# Patient Record
Sex: FEMALE | NOT HISPANIC OR LATINO | URBAN - METROPOLITAN AREA
[De-identification: names, ages, dates, MRNs, and addresses within clinical notes are randomized per-mention and may not be internally consistent; named-entity substitution may affect disease eponyms.]

---

## 2023-01-30 ENCOUNTER — EVALUATION (OUTPATIENT)
Dept: PHYSICAL THERAPY | Facility: CLINIC | Age: 28
End: 2023-01-30

## 2023-01-30 DIAGNOSIS — F84.2 RETT SYNDROME: Primary | ICD-10-CM

## 2023-01-30 DIAGNOSIS — M62.89 QUADRICEP TIGHTNESS: ICD-10-CM

## 2023-01-30 DIAGNOSIS — R26.9 ABNORMALITY OF GAIT AND MOBILITY: ICD-10-CM

## 2023-01-30 NOTE — LETTER
2023    Cintia Weaver MD  [facility] [address] [facility] [address]    Patient: Anisa Doss   YOB: 1995   Date of Visit: 2023     Encounter Diagnosis     ICD-10-CM    1  Rett syndrome  F84 2       2  Quadricep tightness  M62 89       3  Abnormality of gait and mobility  R26 9           Dear Dr Srinivasan Chaney: Thank you for your recent referral of Anisa Doss  Please review the attached evaluation summary from Anaya's recent visit  Please verify that you agree with the plan of care by signing the attached order  If you have any questions or concerns, please do not hesitate to call  I sincerely appreciate the opportunity to share in the care of one of your patients and hope to have another opportunity to work with you in the near future  Sincerely,    Sue Ibarra, PT      Referring Provider:      I certify that I have read the below Plan of Care and certify the need for these services furnished under this plan of treatment while under my care  Cintia Weaver MD  [address] [place] [id number] [facility] [address]  Via Fax: [phone]          PT Evaluation     Today's date: 2023  Patient name: Anisa Doss  : 1995  MRN: [id number]  Referring provider: Amara Baird MD  Dx:   Encounter Diagnosis     ICD-10-CM    1  Rett syndrome  F84 2       2  Quadricep tightness  M62 89       3  Abnormality of gait and mobility  R26 9                      Assessment  Assessment details: Anisa Doss is a 24 y o  female who presents to PT with primary c/o B quad/hip flexor tightness resulting in difficulty walking/poor walking posture  Pt's mother present for this IE for hx taking/subjective intake  Pt presents today with gross limitations in B hip AROM in extension/abduction/ER/IR, B hip joint stiffness, functional BLE strength, forward trunk lean with standing/gait   Findings result in increased reliance on caregiver support, difficulty walking more than short household distances without assistance, fatigue, difficulty performing stairs without assistance  She would benefit from skilled PT to address these in order to increase B hip ROM to improve posture and gait quality while reducing caregiver dependence  Pt educated on related anatomy, posture/positioning, symptom presentation, findings, POC and verbalizes understanding  HEP provided this date and pt verbalizes understanding  They leave this IE with all current questions answered to their satisfaction  Impairments: abnormal gait, abnormal muscle tone, abnormal or restricted ROM, activity intolerance, lacks appropriate home exercise program, poor posture  and poor body mechanics  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  Understanding of Dx/Px/POC: good  Goals  STG-in 4 weeks  1  Pt/mother independent with HEP  2  Able to stand with 25% improved hip extension    LTG-by DC  1  Pt able to ambulate with 50% improved B hip extension/posture  2  Pt able to ascend/descend stairs with 25% less caregiver assistance  3   Increase FOTO by 5 pts    Plan  Plan details: 1-2x/week  Patient would benefit from: skilled physical therapy  Planned modality interventions: biofeedback, cryotherapy, electrical stimulation/Russian stimulation, iontophoresis, unattended electrical stimulation, ultrasound, traction, thermotherapy: paraffin bath, thermotherapy: hydrocollator packs, TENS and low level laser therapy  Planned therapy interventions: aquatic therapy, balance/weight bearing training, body mechanics training, coordination, flexibility, functional ROM exercises, gait training, graded exercise, home exercise program, therapeutic exercise, therapeutic activities, stretching, strengthening, postural training, patient education, neuromuscular re-education, manual therapy and joint mobilization  Frequency: 2x week  Duration in weeks: 8  Plan of Care beginning date: 1/30/2023  Plan of Care expiration date: 3/27/2023  Treatment plan discussed with: patient and caregiver        Subjective Evaluation    History of Present Illness  Mechanism of injury: Pt's mother present for history taking, subjective intake  Jed Gallardo is a 24 y o  female who presents to PT with primary c/o difficulty walking and tightness in B quads resulting in stooped posture  Pt dx with Rett syndrome  Was receiving PT which mother feels did help, but they were unable to go for awhile  Pt's mother is concerned with pt ambulating with forward trunk lean  Pt can ambulate very short distances at home without assistance  Requires help for stairs which she does everyday to get to her bedroom  Mother reports she can ascend stairs mostly on her own, but needs help going down  No apparent signs of pain or distress  Pain  Current pain ratin  At best pain ratin  At worst pain ratin    Social Support  Stairs in house: yes   Lives in: multiple-level home  Lives with: parents (siblings)    Treatments  Previous treatment: physical therapy  Current treatment: physical therapy  Patient Goals  Patient goals for therapy: improved balance, increased motion and independence with ADLs/IADLs  Patient goal: walk more upright        Objective     Active Range of Motion     Additional Active Range of Motion Details  Formal measurements NT this date  Gross limitations in B hip extension, abduction, ER/IR due to muscle tightness/joint stiffness    Strength/Myotome Testing     Additional Strength Details  Pt able to stand independently, walk short distances independently     Ambulation     Observational Gait   Decreased walking speed and stride length               Precautions: Rett syndromes, nonverbal, mother's name is Loreta      RE:  EPOC: 3/27             Manuals             B hip extension stretch MS-supine and SL                                                   Neuro Re-Ed Ther Ex             Prone hip extension stretch/LLPS             Standing at bar -posture                                                                              Pt edu/HEP MS            Ther Activity                                       Gait Training                                       Modalities

## 2023-01-30 NOTE — PROGRESS NOTES
PT Evaluation     Today's date: 2023  Patient name: Cassandra Xavier  : 1995  MRN: [id number]  Referring provider: Diane Ocampo MD  Dx:   Encounter Diagnosis     ICD-10-CM    1  Rett syndrome  F84 2       2  Quadricep tightness  M62 89       3  Abnormality of gait and mobility  R26 9                      Assessment  Assessment details: Cassandra Xavier is a 24 y o  female who presents to PT with primary c/o B quad/hip flexor tightness resulting in difficulty walking/poor walking posture  Pt's mother present for this IE for hx taking/subjective intake  Pt presents today with gross limitations in B hip AROM in extension/abduction/ER/IR, B hip joint stiffness, functional BLE strength, forward trunk lean with standing/gait  Findings result in increased reliance on caregiver support, difficulty walking more than short household distances without assistance, fatigue, difficulty performing stairs without assistance  She would benefit from skilled PT to address these in order to increase B hip ROM to improve posture and gait quality while reducing caregiver dependence  Pt educated on related anatomy, posture/positioning, symptom presentation, findings, POC and verbalizes understanding  HEP provided this date and pt verbalizes understanding  They leave this IE with all current questions answered to their satisfaction  Impairments: abnormal gait, abnormal muscle tone, abnormal or restricted ROM, activity intolerance, lacks appropriate home exercise program, poor posture  and poor body mechanics  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  Understanding of Dx/Px/POC: good  Goals  STG-in 4 weeks  1  Pt/mother independent with HEP  2  Able to stand with 25% improved hip extension    LTG-by DC  1  Pt able to ambulate with 50% improved B hip extension/posture  2  Pt able to ascend/descend stairs with 25% less caregiver assistance  3   Increase FOTO by 5 pts    Plan  Plan details: 1-2x/week  Patient would benefit from: skilled physical therapy  Planned modality interventions: biofeedback, cryotherapy, electrical stimulation/Russian stimulation, iontophoresis, unattended electrical stimulation, ultrasound, traction, thermotherapy: paraffin bath, thermotherapy: hydrocollator packs, TENS and low level laser therapy  Planned therapy interventions: aquatic therapy, balance/weight bearing training, body mechanics training, coordination, flexibility, functional ROM exercises, gait training, graded exercise, home exercise program, therapeutic exercise, therapeutic activities, stretching, strengthening, postural training, patient education, neuromuscular re-education, manual therapy and joint mobilization  Frequency: 2x week  Duration in weeks: 8  Plan of Care beginning date: 2023  Plan of Care expiration date: 3/27/2023  Treatment plan discussed with: patient and caregiver        Subjective Evaluation    History of Present Illness  Mechanism of injury: Pt's mother present for history taking, subjective intake  Gisela Davis is a 24 y o  female who presents to PT with primary c/o difficulty walking and tightness in B quads resulting in stooped posture  Pt dx with Rett syndrome  Was receiving PT which mother feels did help, but they were unable to go for awhile  Pt's mother is concerned with pt ambulating with forward trunk lean  Pt can ambulate very short distances at home without assistance  Requires help for stairs which she does everyday to get to her bedroom  Mother reports she can ascend stairs mostly on her own, but needs help going down  No apparent signs of pain or distress    Pain  Current pain ratin  At best pain ratin  At worst pain ratin    Social Support  Stairs in house: yes   Lives in: multiple-level home  Lives with: parents (siblings)    Treatments  Previous treatment: physical therapy  Current treatment: physical therapy  Patient Goals  Patient goals for therapy: improved balance, increased motion and independence with ADLs/IADLs  Patient goal: walk more upright        Objective     Active Range of Motion     Additional Active Range of Motion Details  Formal measurements NT this date  Gross limitations in B hip extension, abduction, ER/IR due to muscle tightness/joint stiffness    Strength/Myotome Testing     Additional Strength Details  Pt able to stand independently, walk short distances independently     Ambulation     Observational Gait   Decreased walking speed and stride length                Precautions: Rett syndromes, nonverbal, mother's name is Loreta      RE:2/27  EPOC: 3/27             Manuals 1/30            B hip extension stretch MS-supine and SL                                                   Neuro Re-Ed                                                                                                        Ther Ex             Prone hip extension stretch/LLPS             Standing at bar -posture                                                                              Pt edu/HEP MS            Ther Activity                                       Gait Training                                       Modalities

## 2023-02-08 ENCOUNTER — OFFICE VISIT (OUTPATIENT)
Dept: PHYSICAL THERAPY | Facility: CLINIC | Age: 28
End: 2023-02-08

## 2023-02-08 DIAGNOSIS — F84.2 RETT SYNDROME: ICD-10-CM

## 2023-02-08 DIAGNOSIS — R26.9 ABNORMALITY OF GAIT AND MOBILITY: ICD-10-CM

## 2023-02-08 DIAGNOSIS — M62.89 QUADRICEP TIGHTNESS: Primary | ICD-10-CM

## 2023-02-08 NOTE — PROGRESS NOTES
Daily Note     Today's date: 2023  Patient name: Chitra Ham  : 1995  MRN: [id number]  Referring provider: Ryley Aguilar MD  Dx:   Encounter Diagnosis     ICD-10-CM    1  Quadricep tightness  M62 89       2  Rett syndrome  F84 2       3  Abnormality of gait and mobility  R26 9                      Subjective: Pt's mother reports she has been working on [employer] at home, feels she is standing a bit more upright  Notes Oneta [place] was very tired after IE last week  Objective: See treatment diary below      Assessment: Tolerated treatment well  Patient demonstrated fatigue post treatment and would benefit from continued PT  L hip flexor markedly more tight than R, both improved with manual stretching  Improved bilateral hip flexor flexibility in sidelying stretching  Mother helped block pt's trunk from rolling back to supine during sidelying stretching  Plan: Continue per plan of care  Progress treatment as tolerated         Precautions: Rett syndromes, nonverbal, mother's name is Loreta      RE:  EPOC: 3/27             Manuals            B hip extension stretch MS-supine and SL MS-in supine and in SL                                                  Neuro Re-Ed                                                                                                        Ther Ex             Prone hip extension stretch/LLPS             Standing at bar -posture                                                                              Pt edu/HEP MS            Ther Activity                                       Gait Training                                       Modalities

## 2023-02-15 ENCOUNTER — OFFICE VISIT (OUTPATIENT)
Dept: PHYSICAL THERAPY | Facility: CLINIC | Age: 28
End: 2023-02-15

## 2023-02-15 DIAGNOSIS — R26.9 ABNORMALITY OF GAIT AND MOBILITY: ICD-10-CM

## 2023-02-15 DIAGNOSIS — M62.89 QUADRICEP TIGHTNESS: Primary | ICD-10-CM

## 2023-02-15 DIAGNOSIS — F84.2 RETT SYNDROME: ICD-10-CM

## 2023-02-15 NOTE — PROGRESS NOTES
Daily Note     Today's date: 2/15/2023  Patient name: China Davis  : 1995  MRN: [id number]  Referring provider: Sheryl Ac MD  Dx:   Encounter Diagnosis     ICD-10-CM    1  Quadricep tightness  M62 89       2  Rett syndrome  F84 2       3  Abnormality of gait and mobility  R26 9                      Subjective: Pt's mother notes Ernie Pham is able to stretch her legs a bit more easily now  Objective: See treatment diary below      Assessment: Tolerated treatment well  Patient demonstrated fatigue post treatment and would benefit from continued PT  LLE more resistant during manual stretching  Able to maintain more of sustained extension thru RLE this date  Improved extension bilaterally in sidelying positioning  Addition of prone positioning for sustained extension stretch in which pt is able to maintain comfortably and without resistance for about 5 mins  Mother and grandmother report improved ease of gait upon leaving session  Plan: Continue per plan of care  Progress treatment as tolerated         Precautions: Rett syndromes, nonverbal, mother's name is Loreta      RE:  EPOC: 3/27             Manuals 1/30 2/8 2/15          B hip extension stretch MS-supine and SL MS-in supine and in SL MS-supine and SL                                                 Neuro Re-Ed                                                                                                        Ther Ex             Prone hip extension stretch/LLPS   x5'          Standing at bar -posture                                                                              Pt edu/HEP MS            Ther Activity                                       Gait Training                                       Modalities

## 2023-02-22 ENCOUNTER — APPOINTMENT (OUTPATIENT)
Dept: PHYSICAL THERAPY | Facility: CLINIC | Age: 28
End: 2023-02-22

## 2023-03-01 ENCOUNTER — EVALUATION (OUTPATIENT)
Dept: PHYSICAL THERAPY | Facility: CLINIC | Age: 28
End: 2023-03-01

## 2023-03-01 DIAGNOSIS — R26.9 ABNORMALITY OF GAIT AND MOBILITY: ICD-10-CM

## 2023-03-01 DIAGNOSIS — M62.89 QUADRICEP TIGHTNESS: Primary | ICD-10-CM

## 2023-03-01 DIAGNOSIS — F84.2 RETT SYNDROME: ICD-10-CM

## 2023-03-01 NOTE — PROGRESS NOTES
PT Re-Evaluation     Today's date: 3/1/2023  Patient name: Mary Veliz  : 1995  MRN: [id number]  Referring provider: Louie Whalen MD  Dx:   Encounter Diagnosis     ICD-10-CM    1  Quadricep tightness  M62 89       2  Rett syndrome  F84 2       3  Abnormality of gait and mobility  R26 9                      Assessment  Assessment details: 3/1 Re-Eval: Pt's mother reports some improved tissue extensibility when stretching Anaya's legs, with some carryover to general mobility around the house  Pt is able to allow for improved stretching during visits  More objective progress is difficulty to obtain given pt's diagnosis  Improved FOTO score indicative of some functional improvements  She would benefit from continued PT to continued working on B hip flexor/quad mobility to improve functional mobility  Impairments: abnormal gait, abnormal muscle tone, abnormal or restricted ROM, activity intolerance, lacks appropriate home exercise program, poor posture  and poor body mechanics  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  Understanding of Dx/Px/POC: good  Goals  STG-in 4 weeks  1  Pt/mother independent with HEP-met  2  Able to stand with 25% improved hip extension-ongoing    LTG-by DC  1  Pt able to ambulate with 50% improved B hip extension/posture-ongoing  2  Pt able to ascend/descend stairs with 25% less caregiver assistance-ongoing  3   Increase FOTO by 5 pts-met    Plan  Plan details: 1-2x/week  Patient would benefit from: skilled physical therapy  Planned modality interventions: biofeedback, cryotherapy, electrical stimulation/Russian stimulation, iontophoresis, unattended electrical stimulation, ultrasound, traction, thermotherapy: paraffin bath, thermotherapy: hydrocollator packs, TENS and low level laser therapy  Planned therapy interventions: aquatic therapy, balance/weight bearing training, body mechanics training, coordination, flexibility, functional ROM exercises, gait training, graded exercise, home exercise program, therapeutic exercise, therapeutic activities, stretching, strengthening, postural training, patient education, neuromuscular re-education, manual therapy and joint mobilization  Frequency: 2x week  Duration in weeks: 8  Plan of Care beginning date: 2023  Plan of Care expiration date: 3/27/2023  Treatment plan discussed with: patient and caregiver        Subjective Evaluation    History of Present Illness  Mechanism of injury: 3/1 Re-Eval: Pt's mother reports overall improvements in tissue extensibility when she is stretching BODØ at home  Some carryover to improve general mobility/getting around the house  Pain  Current pain ratin  At best pain ratin  At worst pain ratin    Social Support  Stairs in house: yes   Lives in: multiple-level home  Lives with: parents (siblings)    Treatments  Previous treatment: physical therapy  Current treatment: physical therapy  Patient Goals  Patient goals for therapy: improved balance, increased motion and independence with ADLs/IADLs  Patient goal: walk more upright        Objective     Active Range of Motion     Additional Active Range of Motion Details  Formal measurements NT this date  Gross limitations in B hip extension, abduction, ER/IR due to muscle tightness/joint stiffness    Strength/Myotome Testing     Additional Strength Details  Pt able to stand independently, walk short distances independently     Ambulation     Observational Gait   Decreased walking speed and stride length                Precautions: Rett syndromes, nonverbal, mother's name is Loreta      RE:3/27  EPOC: 3/27       Manuals 1/30 2/8 2/15 3/1   B hip extension stretch MS-supine and SL MS-in supine and in SL MS-supine and SL MS-supine and SL                        Neuro Re-Ed                                                        Ther Ex       Prone hip extension stretch/LLPS   x5' x5'   Standing at bar -posture Pt edu/HEP MS      Ther Activity                     Gait Training                     Modalities

## 2023-03-08 ENCOUNTER — OFFICE VISIT (OUTPATIENT)
Dept: PHYSICAL THERAPY | Facility: CLINIC | Age: 28
End: 2023-03-08

## 2023-03-08 DIAGNOSIS — R26.9 ABNORMALITY OF GAIT AND MOBILITY: ICD-10-CM

## 2023-03-08 DIAGNOSIS — M62.89 QUADRICEP TIGHTNESS: Primary | ICD-10-CM

## 2023-03-08 DIAGNOSIS — F84.2 RETT SYNDROME: ICD-10-CM

## 2023-03-08 NOTE — PROGRESS NOTES
Daily Note     Today's date: 3/8/2023  Patient name: Carlitos Sanders  : 1995  MRN: [id number]  Referring provider: Jarad Jensen MD  Dx:   Encounter Diagnosis     ICD-10-CM    1  Quadricep tightness  M62 89       2  Rett syndrome  F84 2       3  Abnormality of gait and mobility  R26 9                      Subjective: No new reports      Objective: See treatment diary below      Assessment: Tolerated treatment well  Patient would benefit from continued PT  Decreased resistance to passive extension bilaterally, RLE continues to be more fluid than L  Sidelying stretching performed with knee bent in effort to get increased hip extension, which was successful  Plan: Continue per plan of care  Progress treatment as tolerated         Precautions: Rett syndromes, nonverbal, mother's name is Loreta      RE:3/27  EPOC: 3/27       Manuals 3/8  2/15 3/1   B hip extension stretch MS-supine and SL  MS-supine and SL MS-supine and SL                        Neuro Re-Ed                                                        Ther Ex       Prone hip extension stretch/LLPS x5'  x5' x5'   Standing at bar -posture                                          Pt edu/HEP       Ther Activity                     Gait Training                     Modalities

## 2023-03-15 ENCOUNTER — OFFICE VISIT (OUTPATIENT)
Dept: PHYSICAL THERAPY | Facility: CLINIC | Age: 28
End: 2023-03-15

## 2023-03-15 DIAGNOSIS — F84.2 RETT SYNDROME: ICD-10-CM

## 2023-03-15 DIAGNOSIS — M62.89 QUADRICEP TIGHTNESS: Primary | ICD-10-CM

## 2023-03-15 DIAGNOSIS — R26.9 ABNORMALITY OF GAIT AND MOBILITY: ICD-10-CM

## 2023-03-15 NOTE — PROGRESS NOTES
Daily Note     Today's date: 3/15/2023  Patient name: Chitra Ham  : 1995  MRN: [id number]  Referring provider: Ryley Aguilar MD  Dx:   Encounter Diagnosis     ICD-10-CM    1  Quadricep tightness  M62 89       2  Rett syndrome  F84 2       3  Abnormality of gait and mobility  R26 9                      Subjective: Pt's mother notes BODØ was able to stretch well into extension last night at home      Objective: See treatment diary below      Assessment: Tolerated treatment well  Patient demonstrated fatigue post treatment and would benefit from continued PT  Decreased extensibility this date/increased pt resistance  Plan: Continue per plan of care  Progress treatment as tolerated         Precautions: Rett syndromes, nonverbal, mother's name is Loreta      RE:3/27  EPOC: 3/27       Manuals 3/8 3/15 2/15 3/1   B hip extension stretch MS-supine and SL MS-supine and SL MS-supine and SL MS-supine and SL                        Neuro Re-Ed                                                        Ther Ex       Prone hip extension stretch/LLPS x5' x5' x5' x5'   Standing at bar -posture                                          Pt edu/HEP       Ther Activity                     Gait Training                     Modalities No

## 2023-03-22 ENCOUNTER — OFFICE VISIT (OUTPATIENT)
Dept: PHYSICAL THERAPY | Facility: CLINIC | Age: 28
End: 2023-03-22

## 2023-03-22 DIAGNOSIS — F84.2 RETT SYNDROME: ICD-10-CM

## 2023-03-22 DIAGNOSIS — M62.89 QUADRICEP TIGHTNESS: Primary | ICD-10-CM

## 2023-03-22 DIAGNOSIS — R26.9 ABNORMALITY OF GAIT AND MOBILITY: ICD-10-CM

## 2023-03-22 NOTE — PROGRESS NOTES
Daily Note     Today's date: 3/22/2023  Patient name: Martinez Odonnell  : 1995  MRN: [id number]  Referring provider: Karely Chaves MD  Dx:   Encounter Diagnosis     ICD-10-CM    1  Quadricep tightness  M62 89       2  Abnormality of gait and mobility  R26 9       3  Rett syndrome  F84 2                      Subjective: Pt's mother notes she stretched Nonasra Funk after she took a shower and was warm with good success  Objective: See treatment diary below      Assessment: Tolerated treatment fair  Patient demonstrated fatigue post treatment and would benefit from continued PT  Increased resistance through LLE this date resulting in increased difficulty getting to LE extension  Only able to maintain prone position for about 2 mins due to resistance  Plan: Continue per plan of care  Progress treatment as tolerated         Precautions: Rett syndromes, nonverbal, mother's name is Loreta      RE:3/27  EPOC: 3/27       Manuals 3/8 3/15 3/22    B hip extension stretch MS-supine and SL MS-supine and SL MS-in supine and SL                         Neuro Re-Ed                                                        Ther Ex       Prone hip extension stretch/LLPS x5' x5' x2'    Standing at bar -posture                                          Pt edu/HEP       Ther Activity                     Gait Training                     Modalities

## 2023-03-29 ENCOUNTER — APPOINTMENT (OUTPATIENT)
Dept: PHYSICAL THERAPY | Facility: CLINIC | Age: 28
End: 2023-03-29

## 2023-03-29 NOTE — PROGRESS NOTES
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  Understanding of Dx/Px/POC: good  Goals  STG-in 4 weeks  1  Pt/mother independent with HEP-met  2  Able to stand with 25% improved hip extension-ongoing    LTG-by DC  1  Pt able to ambulate with 50% improved B hip extension/posture-ongoing  2  Pt able to ascend/descend stairs with 25% less caregiver assistance-ongoing  3   Increase FOTO by 5 pts-met    Plan  Plan details: 1-2x/week  Patient would benefit from: skilled physical therapy  Planned modality interventions: biofeedback, cryotherapy, electrical stimulation/Russian stimulation, iontophoresis, unattended electrical stimulation, ultrasound, traction, thermotherapy: paraffin bath, thermotherapy: hydrocollator packs, TENS and low level laser therapy  Planned therapy interventions: aquatic therapy, balance/weight bearing training, body mechanics training, coordination, flexibility, functional ROM exercises, gait training, graded exercise, home exercise program, therapeutic exercise, therapeutic activities, stretching, strengthening, postural training, patient education, neuromuscular re-education, manual therapy and joint mobilization  Frequency: 2x week  Duration in weeks: 8  Plan of Care beginning date: 3/29/2023  Plan of Care expiration date: 2023  Treatment plan discussed with: patient and caregiver        Subjective Evaluation    History of Present Illness  Mechanism of injury: 3/29 Re-Eval:   Pain  Current pain ratin  At best pain ratin  At worst pain ratin    Social Support  Stairs in house: yes   Lives in: multiple-level home  Lives with: parents (siblings)    Treatments  Previous treatment: physical therapy  Current treatment: physical therapy  Patient Goals  Patient goals for therapy: improved balance, increased motion and independence with ADLs/IADLs  Patient goal: walk more upright        Objective     Active Range of Motion     Additional Active Range of Motion Details  Formal measurements NT this date  Gross limitations in B hip extension, abduction, ER/IR due to muscle tightness/joint stiffness    Strength/Myotome Testing     Additional Strength Details  Pt able to stand independently, walk short distances independently     Ambulation     Observational Gait   Decreased walking speed and stride length                Precautions: Rett syndromes, nonverbal, mother's name is Loreta      RE:4/26  EPOC: 5/24       Manuals 3/8 3/15 3/22    B hip extension stretch MS-supine and SL MS-supine and SL MS-in supine and SL                         Neuro Re-Ed                                                        Ther Ex       Prone hip extension stretch/LLPS x5' x5' x2'    Standing at bar -posture                                          Pt edu/HEP       Ther Activity                     Gait Training                     Modalities

## 2023-04-05 ENCOUNTER — EVALUATION (OUTPATIENT)
Dept: PHYSICAL THERAPY | Facility: CLINIC | Age: 28
End: 2023-04-05

## 2023-04-05 DIAGNOSIS — R26.9 ABNORMALITY OF GAIT AND MOBILITY: ICD-10-CM

## 2023-04-05 DIAGNOSIS — F84.2 RETT SYNDROME: ICD-10-CM

## 2023-04-05 DIAGNOSIS — M62.89 QUADRICEP TIGHTNESS: Primary | ICD-10-CM

## 2023-04-05 NOTE — PROGRESS NOTES
PT Re-Evaluation     Today's date: 2023  Patient name: Maria Fernanda Soni  : 1995  MRN: [id number]  Referring provider: James Rizvi MD  Dx:   Encounter Diagnosis     ICD-10-CM    1  Quadricep tightness  M62 89       2  Abnormality of gait and mobility  R26 9       3  Rett syndrome  F84 2                      Assessment  Assessment details:  Re-Eval: Pt continues to benefit from skilled PT to promote B quad/hip flexor extensibility  Great compliance with home stretching with mom  Pt able to stand more erect after PT session, diminishes with fatigue  No significant increase/decrease in FOTO score, expected given diagnosis  Pt has good tolerance to passive quad/hip flexor stretching, including with and without knee flexion and in various positions (supine/sidelying/prone)  Improved tissue extensibility helps improve gait, posture, stair climbing with mother at home  She would benefit from continued PT to address continued limitations in order to improve mobility, safety, comfort  Impairments: abnormal gait, abnormal muscle tone, abnormal or restricted ROM, activity intolerance, lacks appropriate home exercise program, poor posture  and poor body mechanics  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  Understanding of Dx/Px/POC: good  Goals  STG-in 4 weeks  1  Pt/mother independent with HEP-met  2  Able to stand with 25% improved hip extension-ongoing    LTG-by DC  1  Pt able to ambulate with 50% improved B hip extension/posture-ongoing  2  Pt able to ascend/descend stairs with 25% less caregiver assistance-ongoing  3   Increase FOTO by 5 pts-met    Plan  Plan details: 1-2x/week  Patient would benefit from: skilled physical therapy  Planned modality interventions: biofeedback, cryotherapy, electrical stimulation/Russian stimulation, iontophoresis, unattended electrical stimulation, ultrasound, traction, thermotherapy: paraffin bath, thermotherapy: hydrocollator packs, TENS and low level laser therapy  Planned therapy interventions: aquatic therapy, balance/weight bearing training, body mechanics training, coordination, flexibility, functional ROM exercises, gait training, graded exercise, home exercise program, therapeutic exercise, therapeutic activities, stretching, strengthening, postural training, patient education, neuromuscular re-education, manual therapy and joint mobilization  Frequency: 2x week  Duration in weeks: 4  Plan of Care beginning date: 2023  Plan of Care expiration date: 5/3/2023  Treatment plan discussed with: patient and caregiver        Subjective Evaluation    History of Present Illness  Mechanism of injury:  Re-Eval: Pt's mother reports Warren Schultz is doing well  Mom feels she has a better understanding of how to safely stretch her at home  Good compliance with home stretching reported  Missed last week's session due to illness  Pain  Current pain ratin  At best pain ratin  At worst pain ratin    Social Support  Stairs in house: yes   Lives in: multiple-level home  Lives with: parents (siblings)    Treatments  Previous treatment: physical therapy  Current treatment: physical therapy  Patient Goals  Patient goals for therapy: improved balance, increased motion and independence with ADLs/IADLs  Patient goal: walk more upright        Objective     Active Range of Motion     Additional Active Range of Motion Details  Formal measurements NT this date  Gross limitations in B hip extension, abduction, ER/IR due to muscle tightness/joint stiffness    Strength/Myotome Testing     Additional Strength Details  Pt able to stand independently, walk short distances independently     Ambulation     Observational Gait   Decreased walking speed and stride length                Precautions: Rett syndromes, nonverbal, mother's name is Loreta      RE:5/3  EPOC: 5/3       Manuals 3/8 3/15 3/22 4/5   B hip extension stretch MS-supine and SL MS-supine and SL MS-in supine and SL MS-in supine and SL                        Neuro Re-Ed                                                        Ther Ex       Prone hip extension stretch/LLPS x5' x5' x2' x5'   Standing at bar -posture                                          Pt edu/HEP       Ther Activity                     Gait Training                     Modalities

## 2023-04-05 NOTE — LETTER
2023    Gabino Reich MD  [address] [address] [facility] [address]    Patient: Oracio Mcgraw   YOB: 1995   Date of Visit: 2023     Encounter Diagnosis     ICD-10-CM    1  Quadricep tightness  M62 89       2  Abnormality of gait and mobility  R26 9       3  Rett syndrome  F84 2           Dear Dr Elías Garrett: Thank you for your recent referral of Oracio Mcgraw  Please review the attached evaluation summary from Anaya's recent visit  Please verify that you agree with the plan of care by signing the attached order  If you have any questions or concerns, please do not hesitate to call  I sincerely appreciate the opportunity to share in the care of one of your patients and hope to have another opportunity to work with you in the near future  Sincerely,    Svitlana Hawkins, PT      Referring Provider:      I certify that I have read the below Plan of Care and certify the need for these services furnished under this plan of treatment while under my care  Gabino Reich MD  [facility] [address] [facility] [address]  Via Fax: [phone]          PT Re-Evaluation     Today's date: 2023  Patient name: Oracio Mcgraw  : 1995  MRN: [id number]  Referring provider: Corrie Hale MD  Dx:   Encounter Diagnosis     ICD-10-CM    1  Quadricep tightness  M62 89       2  Abnormality of gait and mobility  R26 9       3  Rett syndrome  F84 2                      Assessment  Assessment details:  Re-Eval: Pt continues to benefit from skilled PT to promote B quad/hip flexor extensibility  Great compliance with home stretching with mom  Pt able to stand more erect after PT session, diminishes with fatigue  No significant increase/decrease in FOTO score, expected given diagnosis  Pt has good tolerance to passive quad/hip flexor stretching, including with and without knee flexion and in various positions (supine/sidelying/prone)   Improved tissue extensibility helps improve gait, posture, stair climbing with mother at home  She would benefit from continued PT to address continued limitations in order to improve mobility, safety, comfort  Impairments: abnormal gait, abnormal muscle tone, abnormal or restricted ROM, activity intolerance, lacks appropriate home exercise program, poor posture  and poor body mechanics  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  Understanding of Dx/Px/POC: good  Goals  STG-in 4 weeks  1  Pt/mother independent with HEP-met  2  Able to stand with 25% improved hip extension-ongoing    LTG-by DC  1  Pt able to ambulate with 50% improved B hip extension/posture-ongoing  2  Pt able to ascend/descend stairs with 25% less caregiver assistance-ongoing  3  Increase FOTO by 5 pts-met    Plan  Plan details: 1-2x/week  Patient would benefit from: skilled physical therapy  Planned modality interventions: biofeedback, cryotherapy, electrical stimulation/Russian stimulation, iontophoresis, unattended electrical stimulation, ultrasound, traction, thermotherapy: paraffin bath, thermotherapy: hydrocollator packs, TENS and low level laser therapy  Planned therapy interventions: aquatic therapy, balance/weight bearing training, body mechanics training, coordination, flexibility, functional ROM exercises, gait training, graded exercise, home exercise program, therapeutic exercise, therapeutic activities, stretching, strengthening, postural training, patient education, neuromuscular re-education, manual therapy and joint mobilization  Frequency: 2x week  Duration in weeks: 4  Plan of Care beginning date: 4/5/2023  Plan of Care expiration date: 5/3/2023  Treatment plan discussed with: patient and caregiver        Subjective Evaluation    History of Present Illness  Mechanism of injury: 4/5 Re-Eval: Pt's mother reports Jeremiah Belcher is doing well  Mom feels she has a better understanding of how to safely stretch her at home   Good compliance with home stretching reported  Missed last week's session due to illness  Pain  Current pain ratin  At best pain ratin  At worst pain ratin    Social Support  Stairs in house: yes   Lives in: multiple-level home  Lives with: parents (siblings)    Treatments  Previous treatment: physical therapy  Current treatment: physical therapy  Patient Goals  Patient goals for therapy: improved balance, increased motion and independence with ADLs/IADLs  Patient goal: walk more upright        Objective     Active Range of Motion     Additional Active Range of Motion Details  Formal measurements NT this date  Gross limitations in B hip extension, abduction, ER/IR due to muscle tightness/joint stiffness    Strength/Myotome Testing     Additional Strength Details  Pt able to stand independently, walk short distances independently     Ambulation     Observational Gait   Decreased walking speed and stride length               Precautions: Rett syndromes, nonverbal, mother's name is Loreta      RE:5/3  EPOC: 5/3       Manuals 3/8 3/15 3/22 4/5   B hip extension stretch MS-supine and SL MS-supine and SL MS-in supine and SL MS-in supine and SL                        Neuro Re-Ed                                                        Ther Ex       Prone hip extension stretch/LLPS x5' x5' x2' x5'   Standing at bar -posture                                          Pt edu/HEP       Ther Activity                     Gait Training                     Modalities

## 2023-04-26 ENCOUNTER — OFFICE VISIT (OUTPATIENT)
Dept: PHYSICAL THERAPY | Facility: CLINIC | Age: 28
End: 2023-04-26

## 2023-04-26 DIAGNOSIS — R26.9 ABNORMALITY OF GAIT AND MOBILITY: ICD-10-CM

## 2023-04-26 DIAGNOSIS — F84.2 RETT SYNDROME: ICD-10-CM

## 2023-04-26 DIAGNOSIS — M62.89 QUADRICEP TIGHTNESS: Primary | ICD-10-CM

## 2023-04-26 NOTE — PROGRESS NOTES
Daily Note     Today's date: 2023  Patient name: Zenobia Saenz  : 1995  MRN: [id number]  Referring provider: Kiran Garcia MD  Dx:   Encounter Diagnosis     ICD-10-CM    1  Quadricep tightness  M62 89       2  Abnormality of gait and mobility  R26 9       3  Rett syndrome  F84 2                      Subjective: No new reports or changes in status  Objective: See treatment diary below      Assessment: Decreased general resistance to supine and sidelying stretching this date bilaterally  Able to sustain stretch for longer durations today  Resistance with attempts at quad stretching in prone position  Noticeable improvement in erect posture in standing when going to leave clinic  Plan: Continue per plan of care  Progress treatment as tolerated         Precautions: Rett syndromes, nonverbal, mother's name is Loreta      RE:5/3  EPOC: 5/3       Manuals      B hip extension stretch MS-in supine (hip and knee ext) and SL (hip ext w/ knee flexion) MS-in supine (hip and knee ext) and SL (hip ext w/ knee flexed)                          Neuro Re-Ed                                                        Ther Ex       Prone hip extension stretch/LLPS x5' x5' with and without knee flex(prone quad str)     Standing at bar -posture                                          Pt edu/HEP       Ther Activity                     Gait Training                     Modalities

## 2023-05-10 ENCOUNTER — EVALUATION (OUTPATIENT)
Dept: PHYSICAL THERAPY | Facility: CLINIC | Age: 28
End: 2023-05-10

## 2023-05-10 DIAGNOSIS — R26.9 ABNORMALITY OF GAIT AND MOBILITY: ICD-10-CM

## 2023-05-10 DIAGNOSIS — M62.89 QUADRICEP TIGHTNESS: Primary | ICD-10-CM

## 2023-05-10 DIAGNOSIS — F84.2 RETT SYNDROME: ICD-10-CM

## 2023-05-10 NOTE — PROGRESS NOTES
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 2                      Assessment  Assessment details: 5/10 Re-Eval/DISCHARGE: pt's mother reports Maliha Lombardo is doing well overall  Good compliance with stretching and positioning at home  They are able to do more walking outside as the weather gets nicer  Improvement in FOTO score indicative of small functional improvements  Muscular tightness and resistance remains, but they are able to manage it well at home  Pt to be DC this date secondary to independence with HEP  Impairments: abnormal gait, abnormal muscle tone, abnormal or restricted ROM, activity intolerance, lacks appropriate home exercise program, poor posture  and poor body mechanics  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  Understanding of Dx/Px/POC: good  Goals  STG-in 4 weeks  1  Pt/mother independent with HEP-met  2  Able to stand with 25% improved hip extension-ongoing    LTG-by DC  1  Pt able to ambulate with 50% improved B hip extension/posture-ongoing  2  Pt able to ascend/descend stairs with 25% less caregiver assistance-ongoing  3   Increase FOTO by 5 pts-met    Plan  Plan details: DC 5/10/23  Patient would benefit from: skilled physical therapy  Planned modality interventions: biofeedback, cryotherapy, electrical stimulation/Russian stimulation, iontophoresis, unattended electrical stimulation, ultrasound, traction, thermotherapy: paraffin bath, thermotherapy: hydrocollator packs, TENS and low level laser therapy  Planned therapy interventions: aquatic therapy, balance/weight bearing training, body mechanics training, coordination, flexibility, functional ROM exercises, gait training, graded exercise, home exercise program, therapeutic exercise, therapeutic activities, stretching, strengthening, postural training, patient education, neuromuscular re-education, manual therapy and joint mobilization  Plan of Care beginning date: 5/10/2023  Plan of Care expiration date: 5/10/2023  Treatment plan discussed with: patient and caregiver        Subjective Evaluation    History of Present Illness  Mechanism of injury: 5/10 Re-eval: Pt's mother reports Alessia Arias is doing well  Good compliance with stretching and positioning at home  Able to walk more outside as the weather gets nicer  Pain  Current pain ratin  At best pain ratin  At worst pain ratin    Social Support  Stairs in house: yes   Lives in: multiple-level home  Lives with: parents (siblings)    Treatments  Previous treatment: physical therapy  Current treatment: physical therapy  Patient Goals  Patient goals for therapy: improved balance, increased motion and independence with ADLs/IADLs  Patient goal: walk more upright        Objective     Active Range of Motion     Additional Active Range of Motion Details  Formal measurements NT this date  Gross limitations in B hip extension, abduction, ER/IR due to muscle tightness/joint stiffness    Strength/Myotome Testing     Additional Strength Details  Pt able to stand independently, walk short distances independently     Ambulation     Observational Gait   Decreased walking speed and stride length                Precautions: Rett syndromes, nonverbal, mother's name is Loreta      RE:5/10  EPOC: 5/10       Manuals 4/12 4/26 5/10    B hip extension stretch MS-in supine (hip and knee ext) and SL (hip ext w/ knee flexion) MS-in supine (hip and knee ext) and SL (hip ext w/ knee flexed) MS-in supine (hip and knee ext) and SL (hip ext w/ knee flexion)                         Neuro Re-Ed                                                        Ther Ex       Prone hip extension stretch/LLPS x5' x5' with and without knee flex(prone quad str) Standing at bar -posture                                          Pt edu/HEP   MS-RE/DC, FOTO, subj/obj    Ther Activity                     Gait Training                     Modalities

## 2023-05-10 NOTE — LETTER
May 11, 2023    Isaias Angelo MD  [facility] [address] [facility] [address]    Patient: Jacoby Perrin   YOB: 1995   Date of Visit: 5/10/2023     Encounter Diagnosis     ICD-10-CM    1  Quadricep tightness  M62 89       2  Abnormality of gait and mobility  R26 9       3  Rett syndrome  F84 2           Dear Dr Everette Garcia: Thank you for your recent referral of Jacoby Perrin  Please review the attached evaluation summary from Anaya's recent visit  Please verify that you agree with the plan of care by signing the attached order  If you have any questions or concerns, please do not hesitate to call  I sincerely appreciate the opportunity to share in the care of one of your patients and hope to have another opportunity to work with you in the near future  Sincerely,    Jean Dean, PT      Referring Provider:      I certify that I have read the below Plan of Care and certify the need for these services furnished under this plan of treatment while under my care  Isaias Angelo MD  [address] [place] [address] [facility] [address]  Via Fax: [phone]          PT Re-Evaluation  and PT Discharge    Today's date: 5/10/2023  Patient name: Jacoby Perrin  : 1995  MRN: [id number]  Referring provider: Mack Haywood MD  Dx:   Encounter Diagnosis     ICD-10-CM    1  Quadricep tightness  M62 89       2  Abnormality of gait and mobility  R26 9       3  Rett syndrome  F84 2                      Assessment  Assessment details: 5/10 Re-Eval/DISCHARGE: pt's mother reports Jordan Lopez is doing well overall  Good compliance with stretching and positioning at home  They are able to do more walking outside as the weather gets nicer  Improvement in FOTO score indicative of small functional improvements  Muscular tightness and resistance remains, but they are able to manage it well at home   Pt to be DC this date secondary to independence with HEP     Impairments: abnormal gait, abnormal muscle tone, abnormal or restricted ROM, activity intolerance, lacks appropriate home exercise program, poor posture  and poor body mechanics  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  Understanding of Dx/Px/POC: good  Goals  STG-in 4 weeks  1  Pt/mother independent with HEP-met  2  Able to stand with 25% improved hip extension-ongoing    LTG-by DC  1  Pt able to ambulate with 50% improved B hip extension/posture-ongoing  2  Pt able to ascend/descend stairs with 25% less caregiver assistance-ongoing  3  Increase FOTO by 5 pts-met    Plan  Plan details: DC 5/10/23  Patient would benefit from: skilled physical therapy  Planned modality interventions: biofeedback, cryotherapy, electrical stimulation/Russian stimulation, iontophoresis, unattended electrical stimulation, ultrasound, traction, thermotherapy: paraffin bath, thermotherapy: hydrocollator packs, TENS and low level laser therapy  Planned therapy interventions: aquatic therapy, balance/weight bearing training, body mechanics training, coordination, flexibility, functional ROM exercises, gait training, graded exercise, home exercise program, therapeutic exercise, therapeutic activities, stretching, strengthening, postural training, patient education, neuromuscular re-education, manual therapy and joint mobilization  Plan of Care beginning date: 5/10/2023  Plan of Care expiration date: 5/10/2023  Treatment plan discussed with: patient and caregiver        Subjective Evaluation    History of Present Illness  Mechanism of injury: 5/10 Re-eval: Pt's mother reports Diana Rapp is doing well  Good compliance with stretching and positioning at home  Able to walk more outside as the weather gets nicer    Pain  Current pain ratin  At best pain ratin  At worst pain ratin    Social Support  Stairs in house: yes   Lives in: multiple-level home  Lives with: parents (siblings)    Treatments  Previous treatment: physical therapy  Current treatment: physical therapy  Patient Goals  Patient goals for therapy: improved balance, increased motion and independence with ADLs/IADLs  Patient goal: walk more upright        Objective     Active Range of Motion     Additional Active Range of Motion Details  Formal measurements NT this date  Gross limitations in B hip extension, abduction, ER/IR due to muscle tightness/joint stiffness    Strength/Myotome Testing     Additional Strength Details  Pt able to stand independently, walk short distances independently     Ambulation     Observational Gait   Decreased walking speed and stride length               Precautions: Rett syndromes, nonverbal, mother's name is Loreta      RE:5/10  EPOC: 5/10       Manuals 4/12 4/26 5/10    B hip extension stretch MS-in supine (hip and knee ext) and SL (hip ext w/ knee flexion) MS-in supine (hip and knee ext) and SL (hip ext w/ knee flexed) MS-in supine (hip and knee ext) and SL (hip ext w/ knee flexion)                         Neuro Re-Ed                                                        Ther Ex       Prone hip extension stretch/LLPS x5' x5' with and without knee flex(prone quad str)     Standing at bar -posture                                          Pt edu/HEP   MS-RE/DC, FOTO, subj/obj    Ther Activity                     Gait Training                     Modalities

## 2023-05-19 ENCOUNTER — HOSPITAL ENCOUNTER (INPATIENT)
Facility: HOSPITAL | Age: 28
LOS: 2 days | Discharge: HOME | End: 2023-05-21
Attending: EMERGENCY MEDICINE | Admitting: INTERNAL MEDICINE

## 2023-05-19 ENCOUNTER — APPOINTMENT (INPATIENT)
Dept: CT IMAGING | Facility: HOSPITAL | Age: 28
End: 2023-05-19

## 2023-05-19 ENCOUNTER — APPOINTMENT (EMERGENCY)
Dept: RADIOLOGY | Facility: HOSPITAL | Age: 28
End: 2023-05-19

## 2023-05-19 DIAGNOSIS — J18.9 PNEUMONIA INVOLVING LEFT LUNG: Primary | ICD-10-CM

## 2023-05-19 DIAGNOSIS — A41.9 SEPSIS (HCC): ICD-10-CM

## 2023-05-19 DIAGNOSIS — R09.02 HYPOXIA: ICD-10-CM

## 2023-05-19 PROBLEM — F79 INTELLECTUAL DISABILITY: Status: ACTIVE | Noted: 2023-05-19

## 2023-05-19 LAB
ALBUMIN SERPL BCP-MCNC: 3.7 G/DL (ref 3.5–5)
ALP SERPL-CCNC: 74 U/L (ref 34–104)
ALT SERPL W P-5'-P-CCNC: 16 U/L (ref 7–52)
ANION GAP SERPL CALCULATED.3IONS-SCNC: 8 MMOL/L (ref 4–13)
AST SERPL W P-5'-P-CCNC: 19 U/L (ref 13–39)
ATRIAL RATE: 115 BPM
BASOPHILS # BLD MANUAL: 0 THOUSAND/UL (ref 0–0.1)
BASOPHILS NFR MAR MANUAL: 0 % (ref 0–1)
BILIRUB SERPL-MCNC: 0.85 MG/DL (ref 0.2–1)
BUN SERPL-MCNC: 20 MG/DL (ref 5–25)
CALCIUM SERPL-MCNC: 9.6 MG/DL (ref 8.4–10.2)
CHLORIDE SERPL-SCNC: 104 MMOL/L (ref 96–108)
CO2 SERPL-SCNC: 25 MMOL/L (ref 21–32)
CREAT SERPL-MCNC: 0.68 MG/DL (ref 0.6–1.3)
EOSINOPHIL # BLD MANUAL: 0 THOUSAND/UL (ref 0–0.4)
EOSINOPHIL NFR BLD MANUAL: 0 % (ref 0–6)
ERYTHROCYTE [DISTWIDTH] IN BLOOD BY AUTOMATED COUNT: 13.2 % (ref 11.6–15.1)
GFR SERPL CREATININE-BSD FRML MDRD: 121 ML/MIN/1.73SQ M
GLUCOSE SERPL-MCNC: 132 MG/DL (ref 65–140)
HCT VFR BLD AUTO: 44.3 % (ref 34.8–46.1)
HGB BLD-MCNC: 14.5 G/DL (ref 11.5–15.4)
LACTATE SERPL-SCNC: 2.3 MMOL/L (ref 0.5–2)
LACTATE SERPL-SCNC: 2.9 MMOL/L (ref 0.5–2)
LACTATE SERPL-SCNC: 3.9 MMOL/L (ref 0.5–2)
LACTATE SERPL-SCNC: 4.2 MMOL/L (ref 0.5–2)
LACTATE SERPL-SCNC: 4.3 MMOL/L (ref 0.5–2)
LACTATE SERPL-SCNC: 4.8 MMOL/L (ref 0.5–2)
LYMPHOCYTES # BLD AUTO: 1.35 THOUSAND/UL (ref 0.6–4.47)
LYMPHOCYTES # BLD AUTO: 14 % (ref 14–44)
MCH RBC QN AUTO: 30.1 PG (ref 26.8–34.3)
MCHC RBC AUTO-ENTMCNC: 32.7 G/DL (ref 31.4–37.4)
MCV RBC AUTO: 92 FL (ref 82–98)
METAMYELOCYTES NFR BLD MANUAL: 1 % (ref 0–1)
MONOCYTES # BLD AUTO: 0.29 THOUSAND/UL (ref 0–1.22)
MONOCYTES NFR BLD: 3 % (ref 4–12)
NEUTROPHILS # BLD MANUAL: 7.82 THOUSAND/UL (ref 1.85–7.62)
NEUTS BAND NFR BLD MANUAL: 6 % (ref 0–8)
NEUTS SEG NFR BLD AUTO: 75 % (ref 43–75)
P AXIS: 50 DEGREES
PLATELET # BLD AUTO: 296 THOUSANDS/UL (ref 149–390)
PLATELET BLD QL SMEAR: ADEQUATE
PMV BLD AUTO: 9.5 FL (ref 8.9–12.7)
POTASSIUM SERPL-SCNC: 3.6 MMOL/L (ref 3.5–5.3)
PR INTERVAL: 122 MS
PROCALCITONIN SERPL-MCNC: 17.92 NG/ML
PROT SERPL-MCNC: 7.2 G/DL (ref 6.4–8.4)
QRS AXIS: 58 DEGREES
QRSD INTERVAL: 74 MS
QT INTERVAL: 306 MS
QTC INTERVAL: 423 MS
RBC # BLD AUTO: 4.82 MILLION/UL (ref 3.81–5.12)
RBC MORPH BLD: NORMAL
SARS-COV-2 RNA RESP QL NAA+PROBE: NEGATIVE
SODIUM SERPL-SCNC: 137 MMOL/L (ref 135–147)
T WAVE AXIS: 23 DEGREES
TOXIC GRANULES BLD QL SMEAR: PRESENT
VARIANT LYMPHS # BLD AUTO: 1 %
VENTRICULAR RATE: 115 BPM
WBC # BLD AUTO: 9.65 THOUSAND/UL (ref 4.31–10.16)
WBC TOXIC VACUOLES BLD QL SMEAR: PRESENT

## 2023-05-19 RX ORDER — SODIUM CHLORIDE 9 MG/ML
100 INJECTION, SOLUTION INTRAVENOUS CONTINUOUS
Status: DISCONTINUED | OUTPATIENT
Start: 2023-05-19 | End: 2023-05-21 | Stop reason: HOSPADM

## 2023-05-19 RX ORDER — METHYLPREDNISOLONE SODIUM SUCCINATE 125 MG/2ML
80 INJECTION, POWDER, LYOPHILIZED, FOR SOLUTION INTRAMUSCULAR; INTRAVENOUS ONCE
Status: COMPLETED | OUTPATIENT
Start: 2023-05-19 | End: 2023-05-19

## 2023-05-19 RX ORDER — GUAIFENESIN 600 MG/1
600 TABLET, EXTENDED RELEASE ORAL 2 TIMES DAILY
Status: DISCONTINUED | OUTPATIENT
Start: 2023-05-19 | End: 2023-05-20

## 2023-05-19 RX ORDER — IPRATROPIUM BROMIDE AND ALBUTEROL SULFATE 2.5; .5 MG/3ML; MG/3ML
3 SOLUTION RESPIRATORY (INHALATION) ONCE
Status: COMPLETED | OUTPATIENT
Start: 2023-05-19 | End: 2023-05-19

## 2023-05-19 RX ORDER — ACETAMINOPHEN 325 MG/1
650 TABLET ORAL EVERY 6 HOURS PRN
Status: DISCONTINUED | OUTPATIENT
Start: 2023-05-19 | End: 2023-05-21 | Stop reason: HOSPADM

## 2023-05-19 RX ORDER — LEVALBUTEROL INHALATION SOLUTION 1.25 MG/3ML
1.25 SOLUTION RESPIRATORY (INHALATION) EVERY 6 HOURS PRN
Status: DISCONTINUED | OUTPATIENT
Start: 2023-05-19 | End: 2023-05-19

## 2023-05-19 RX ORDER — ACETAMINOPHEN 325 MG/1
975 TABLET ORAL ONCE
Status: DISCONTINUED | OUTPATIENT
Start: 2023-05-19 | End: 2023-05-19

## 2023-05-19 RX ORDER — BENZONATATE 100 MG/1
100 CAPSULE ORAL 3 TIMES DAILY PRN
Status: DISCONTINUED | OUTPATIENT
Start: 2023-05-19 | End: 2023-05-21 | Stop reason: HOSPADM

## 2023-05-19 RX ORDER — LEVALBUTEROL INHALATION SOLUTION 1.25 MG/3ML
1.25 SOLUTION RESPIRATORY (INHALATION)
Status: DISCONTINUED | OUTPATIENT
Start: 2023-05-19 | End: 2023-05-21 | Stop reason: HOSPADM

## 2023-05-19 RX ORDER — ACETAMINOPHEN 160 MG/5ML
650 SUSPENSION ORAL EVERY 6 HOURS PRN
Status: DISCONTINUED | OUTPATIENT
Start: 2023-05-19 | End: 2023-05-19

## 2023-05-19 RX ORDER — ACETAMINOPHEN 325 MG/1
650 TABLET ORAL ONCE
Status: COMPLETED | OUTPATIENT
Start: 2023-05-19 | End: 2023-05-19

## 2023-05-19 RX ORDER — IPRATROPIUM BROMIDE AND ALBUTEROL SULFATE 2.5; .5 MG/3ML; MG/3ML
3 SOLUTION RESPIRATORY (INHALATION)
Status: DISCONTINUED | OUTPATIENT
Start: 2023-05-19 | End: 2023-05-19

## 2023-05-19 RX ORDER — IPRATROPIUM BROMIDE AND ALBUTEROL SULFATE 2.5; .5 MG/3ML; MG/3ML
3 SOLUTION RESPIRATORY (INHALATION) EVERY 4 HOURS PRN
Status: DISCONTINUED | OUTPATIENT
Start: 2023-05-19 | End: 2023-05-21 | Stop reason: HOSPADM

## 2023-05-19 RX ADMIN — ACETAMINOPHEN 650 MG: 325 TABLET, FILM COATED ORAL at 09:37

## 2023-05-19 RX ADMIN — SODIUM CHLORIDE 100 ML/HR: 0.9 INJECTION, SOLUTION INTRAVENOUS at 04:47

## 2023-05-19 RX ADMIN — IPRATROPIUM BROMIDE 0.5 MG: 0.5 SOLUTION RESPIRATORY (INHALATION) at 04:26

## 2023-05-19 RX ADMIN — SODIUM CHLORIDE 500 ML: 0.9 INJECTION, SOLUTION INTRAVENOUS at 04:15

## 2023-05-19 RX ADMIN — LEVALBUTEROL HYDROCHLORIDE 1.25 MG: 1.25 SOLUTION RESPIRATORY (INHALATION) at 10:23

## 2023-05-19 RX ADMIN — LEVALBUTEROL HYDROCHLORIDE 1.25 MG: 1.25 SOLUTION RESPIRATORY (INHALATION) at 19:36

## 2023-05-19 RX ADMIN — GUAIFENESIN 600 MG: 600 TABLET ORAL at 17:26

## 2023-05-19 RX ADMIN — SODIUM CHLORIDE 1000 ML: 0.9 INJECTION, SOLUTION INTRAVENOUS at 11:50

## 2023-05-19 RX ADMIN — ACETAMINOPHEN 650 MG: 325 TABLET ORAL at 04:01

## 2023-05-19 RX ADMIN — IPRATROPIUM BROMIDE AND ALBUTEROL SULFATE 3 ML: .5; 3 SOLUTION RESPIRATORY (INHALATION) at 02:23

## 2023-05-19 RX ADMIN — IPRATROPIUM BROMIDE AND ALBUTEROL SULFATE 3 ML: .5; 3 SOLUTION RESPIRATORY (INHALATION) at 01:37

## 2023-05-19 RX ADMIN — IPRATROPIUM BROMIDE 0.5 MG: 0.5 SOLUTION RESPIRATORY (INHALATION) at 19:36

## 2023-05-19 RX ADMIN — METHYLPREDNISOLONE SODIUM SUCCINATE 80 MG: 125 INJECTION, POWDER, FOR SOLUTION INTRAMUSCULAR; INTRAVENOUS at 03:58

## 2023-05-19 RX ADMIN — SODIUM CHLORIDE 500 ML: 0.9 INJECTION, SOLUTION INTRAVENOUS at 20:00

## 2023-05-19 RX ADMIN — IPRATROPIUM BROMIDE 0.5 MG: 0.5 SOLUTION RESPIRATORY (INHALATION) at 10:23

## 2023-05-19 RX ADMIN — LEVALBUTEROL HYDROCHLORIDE 1.25 MG: 1.25 SOLUTION RESPIRATORY (INHALATION) at 04:30

## 2023-05-19 RX ADMIN — IPRATROPIUM BROMIDE 0.5 MG: 0.5 SOLUTION RESPIRATORY (INHALATION) at 13:19

## 2023-05-19 RX ADMIN — LEVALBUTEROL HYDROCHLORIDE 1.25 MG: 1.25 SOLUTION RESPIRATORY (INHALATION) at 13:19

## 2023-05-19 RX ADMIN — CEFTRIAXONE SODIUM 1000 MG: 10 INJECTION, POWDER, FOR SOLUTION INTRAVENOUS at 03:40

## 2023-05-19 RX ADMIN — SODIUM CHLORIDE 1000 ML: 0.9 INJECTION, SOLUTION INTRAVENOUS at 03:12

## 2023-05-19 RX ADMIN — IPRATROPIUM BROMIDE AND ALBUTEROL SULFATE 3 ML: .5; 3 SOLUTION RESPIRATORY (INHALATION) at 02:24

## 2023-05-19 RX ADMIN — GUAIFENESIN 600 MG: 600 TABLET ORAL at 05:14

## 2023-05-19 NOTE — H&P
[address] [place] [address]  H&P  Name: Josafat Ramirez 34 y o  female I MRN: [id number]  Unit/Bed#: ED 20 I Date of Admission: 5/19/2023   Date of Service: 5/19/2023 I Hospital Day: 0      Assessment/Plan   * Pneumonia  Assessment & Plan  · 2 days of worsening tachypnea, increased work of breathing, fevers per mom  · Patient placed on 4 L nasal cannula due to increased work of breathing, due to persistent coughing fit now placed on 5 L nasal cannula saturating mid 90s, continue to monitor  · Chest x-ray revealing left upper lobe and left middle lobe opacity  · COVID test negative  · Incentive spirometry, respiratory protocol  · Check sputum culture and Gram stain, strep pneumoniae, Legionella  · Continue IV ceftriaxone 1 g daily  · As needed nebulizer treatments    Sepsis ([facility] [address] )  Assessment & Plan  · Meeting criteria due to being febrile, tachycardic, tachypneic in the setting of pneumonia  · Lactic acid initially elevated at 4 2, continue to trend until less than 2  · Procalcitonin significantly elevated  · BC x2 pending  · Given IV fluid bolus in the ED, will give another IV fluid bolus now and continue aggressive IV fluid hydration  · Continue IV antibiotic          VTE Pharmacologic Prophylaxis: VTE Score: 2 Low Risk (Score 0-2) - Encourage Ambulation  Code Status: Level 1 - Full Code per pt's mom  Discussion with family: Updated  (mother) at bedside  Anticipated Length of Stay: Patient will be admitted on an inpatient basis with an anticipated length of stay of greater than 2 midnights secondary to see above      Total Time Spent on Date of Encounter in care of patient: 75 minutes This time was spent on one or more of the following: performing physical exam; counseling and coordination of care; obtaining or reviewing history; documenting in the medical record; reviewing/ordering tests, medications or procedures; communicating with other healthcare professionals and discussing with patient's family/caregivers  Chief Complaint:    Chief Complaint   Patient presents with   • Cough     Hx of autism, non-productive wet cough, mom concerned for possible start of pna  Mom giving mucinex at home  History of Present Illness:  Sanjeev Meléndez is a 34 y o  female with a PMH of Rett syndrome, intellectual disability and is nonverbal at baseline who presents with her mother who reports over the last 2 days patient has appeared like she is had a cold and severe chest congestion  She reports over the last 2 days she has noticed increased work of breathing and tachypnea and patient did have a fever at least once  Patient not showing any signs of pain, but unable to express if she is having pain  Patient's mother also reports she has had a decreased appetite which is not like her at all as she usually loves to eat  She also ports she has no she has had chest congestion and just seems like she has a wet cough, but patient unable to have deep cough enough to bring mucus up  Review of Systems:  Review of Systems   Unable to perform ROS: Patient nonverbal       Past Medical and Surgical History:   Past Medical History:   Diagnosis Date   • Rett syndrome        History reviewed  No pertinent surgical history  Meds/Allergies:  Prior to Admission medications    Not on File     I have reviewed home medications with patient family member  Allergies:    Allergies   Allergen Reactions   • Ibuprofen Hyperactivity   • Pollen Extract Sneezing   • Vancomycin Other (See Comments)     Red khoi        Social History:  Marital Status: Single     Patient Pre-hospital Living Situation: Home  Patient Pre-hospital Level of Mobility: walks  Patient Pre-hospital Diet Restrictions: n/a  Substance Use History:   Social History     Substance and Sexual Activity   Alcohol Use Not Currently     Social History     Tobacco Use   Smoking Status Never   Smokeless Tobacco Never     Social History     Substance and "Sexual Activity   Drug Use Not Currently       Family History:  History reviewed  No pertinent family history  Physical Exam:     Vitals:   Blood Pressure: 120/79 (05/19/23 0445)  Pulse: (!) 140 (05/19/23 0445)  Temperature: (!) 101 7 °F (38 7 °C) (provider aware) (05/19/23 0313)  Temp Source: Oral (05/19/23 0313)  Respirations: (!) 66 (05/19/23 0445)  Height: 4' 8\" (142 2 cm) (05/19/23 0108)  Weight - Scale: 38 6 kg (85 lb) (05/19/23 0108)  SpO2: 97 % (05/19/23 0451)    Physical Exam  Vitals and nursing note reviewed  Constitutional:       General: She is in acute distress  Appearance: She is not diaphoretic  Comments: Nonverbal at baseline  Appears slightly distressed due to persistent coughing fit   Cardiovascular:      Rate and Rhythm: Regular rhythm  Tachycardia present  Pulmonary:      Effort: Tachypnea present  No respiratory distress  Breath sounds: No stridor  Rhonchi present  No decreased breath sounds, wheezing or rales  Abdominal:      General: Abdomen is flat  Bowel sounds are normal       Palpations: Abdomen is soft  Tenderness: There is no guarding  Musculoskeletal:      Right lower leg: No edema  Left lower leg: No edema  Skin:     General: Skin is warm and dry  Neurological:      Mental Status: She is alert        Comments: Patient nonverbal at baseline   Psychiatric:      Comments: Unable to examine due to patient being nonverbal at baseline          Additional Data:     Lab Results:  Results from last 7 days   Lab Units 05/19/23  0218   WBC Thousand/uL 9 65   HEMOGLOBIN g/dL 14 5   HEMATOCRIT % 44 3   PLATELETS Thousands/uL 296   BANDS PCT % 6   LYMPHO PCT % 14   MONO PCT % 3*   EOS PCT % 0     Results from last 7 days   Lab Units 05/19/23  0218   SODIUM mmol/L 137   POTASSIUM mmol/L 3 6   CHLORIDE mmol/L 104   CO2 mmol/L 25   BUN mg/dL 20   CREATININE mg/dL 0 68   ANION GAP mmol/L 8   CALCIUM mg/dL 9 6   ALBUMIN g/dL 3 7   TOTAL BILIRUBIN mg/dL 0 85   ALK " PHOS U/L 74   ALT U/L 16   AST U/L 19   GLUCOSE RANDOM mg/dL 132                 Results from last 7 days   Lab Units 05/19/23  0337 05/19/23  0218   LACTIC ACID mmol/L 4 2*  --    PROCALCITONIN ng/ml  --  17 92*       Lines/Drains:  Invasive Devices     Peripheral Intravenous Line  Duration           Peripheral IV 05/19/23 Right Antecubital <1 day                    Imaging: Personally reviewed the following imaging: chest xray  XR chest 2 views    (Results Pending)       EKG and Other Studies Reviewed on Admission:   · EKG: No EKG obtained  ** Please Note: This note has been constructed using a voice recognition system   **

## 2023-05-19 NOTE — RESPIRATORY THERAPY NOTE
05/19/23 0431   Respiratory Assessment   Resp Comments uncontrollable coughing mixed with periods of tachypnea   Oxygen Therapy/Pulse Ox   O2 Device Nasal cannula   O2 Therapy Oxygen   Nasal Cannula O2 Flow Rate (L/min) 4 L/min   Calculated FIO2 (%) - Nasal Cannula 36     Spo2 n/a - pt rubbing fingers together and moving arm up and down

## 2023-05-19 NOTE — PROGRESS NOTES
Deterioration Index Critical Care Recommendations  Room #: ED 20  Deterioration index score: 61 03%    Critical Care recommends   · Agree with tylenol to treat fever and crystalloid bolus, which should improve tachycardia  · Patient received one dose of IV rocephin, recommend to continue  · Continue xopenex/atrovent nebulizers TID  · Initiate airway clearance protocol  · Can consider NT suctioning PRN to assist patient in clearing secretions  · Patient received one dose of solu-medrol 80 mg, if wheeze present would recommend continuing 40 mg Q8h  · Trend procalcitonin, fever/temperature curve    Spoke with Adonis Barrera PA-C from primary team    Brief summary:   Critical care was brought to the patient's bedside via deterioration index alert  The alert was concerning for primarily tachypnea and tachycardia  Patient is admitted with CAP and is currently febrile at 101 7 degrees  She was being provided a dose of tylenol during my assessment to treat her fever as well as a crystalloid bolus  Heart rate in the 130's which is slightly increased from arrival when patient presented in the 120's  On my assessment patient is tachypnic with respiratory rate in the 30's, however this is difficult to assess due to history of intellectual disability and frequent small/weak coughing  She is 96-97% on 4 L NC and does not appear to be in distress  Patient is appropriate to remain med-surg level of care at this time  Please reach out to critical care if patient's condition worsens and will re-evaluate for transfer  Contribution Factor Value   49% Respiratory rate 56   21% Pulse 143   18% Supplemental oxygen Nasal cannula   1 Age 34years old   3% WBC count 9 65 Thousand/uL   3% Temperature 101 7 °F (38 7 °C)   1% Sodium 137 mmol/L   <6% Systolic 744   <9% Potassium 3 6 mmol/L   <1% Hematocrit 44 3 %   <1% Pulse oximetry 96 %       Please contact critical care via [employer] with any questions or concerns

## 2023-05-19 NOTE — ASSESSMENT & PLAN NOTE
· 2 days of worsening tachypnea, increased work of breathing, fevers per mom  · Patient placed on 4 L nasal cannula due to increased work of breathing, due to persistent coughing fit now placed on 5 L nasal cannula saturating mid 90s, continue to monitor  · Chest x-ray revealing left upper lobe and left middle lobe opacity  · COVID test negative  · Incentive spirometry, respiratory protocol  · Check sputum culture and Gram stain, strep pneumoniae, Legionella  · Continue IV ceftriaxone 1 g daily  · As needed nebulizer treatments

## 2023-05-19 NOTE — ASSESSMENT & PLAN NOTE
· Meeting criteria due to being febrile, tachycardic, tachypneic in the setting of pneumonia  · Lactic acid initially elevated at 4 2, continue to trend until less than 2  · Procalcitonin significantly elevated  · BC x2 pending  · Given IV fluid bolus in the ED, will give another IV fluid bolus now and continue aggressive IV fluid hydration  · Continue IV antibiotic

## 2023-05-19 NOTE — RESPIRATORY THERAPY NOTE
RT Protocol Note  Ivan Plant 34 y o  female MRN: [id number]  Unit/Bed#: ED 20 Encounter: [id number]    Assessment    Principal Problem:    Pneumonia  Active Problems:    Sepsis [facility])      Home Pulmonary Medications:     05/19/23 1026   Respiratory Protocol   Protocol Initiated? No   Protocol Selection Respiratory   Language Barrier? Yes   Medical & Social History Reviewed? Yes   Diagnostic Studies Reviewed? Yes   Physical Assessment Performed?  Yes   Respiratory Plan Mild Distress pathway   Respiratory Assessment   Assessment Type Pre-treatment   General Appearance Awake   Respiratory Pattern Tachypneic   Chest Assessment Chest expansion symmetrical   Bilateral Breath Sounds Coarse   Resp Comments resp protocol completed, patient's mother states she notices marked improvement with the nebs, will change to TID per mother   O2 Device ra   Additional Assessments   Pulse (!) 116   Respirations (!) 61   SpO2 97 %            Past Medical History:   Diagnosis Date    Rett syndrome      Social History     Socioeconomic History    Marital status: Single     Spouse name: None    Number of children: None    Years of education: None    Highest education level: None   Occupational History    None   Tobacco Use    Smoking status: Never    Smokeless tobacco: Never   Substance and Sexual Activity    Alcohol use: Not Currently    Drug use: Not Currently    Sexual activity: None   Other Topics Concern    None   Social History Narrative    None     Social Determinants of Health     Financial Resource Strain: Not on file   Food Insecurity: Not on file   Transportation Needs: Not on file   Physical Activity: Not on file   Stress: Not on file   Social Connections: Not on file   Intimate Partner Violence: Not on file   Housing Stability: Not on file       Subjective         Objective    Physical Exam:   Assessment Type: Pre-treatment  General Appearance: Awake  Respiratory Pattern: Tachypneic  Chest Assessment: Chest expansion "symmetrical  Bilateral Breath Sounds: Coarse  Cough: Non-productive, Other (Comment) (seems to be conciously uncontrollable)  O2 Device: ra    Vitals:  Blood pressure (!) 88/50, pulse (!) 116, temperature (!) 101 7 °F (38 7 °C), temperature source Oral, resp  rate (!) 61, height 4' 8\" (1 422 m), weight 38 6 kg (85 lb), last menstrual period 05/12/2023, SpO2 97 %            Imaging and other studies:     O2 Device: ra     Plan    Respiratory Plan: Mild Distress pathway        Resp Comments: resp protocol completed, patient's mother states she notices marked improvement with the nebs, will change to TID per mother   "

## 2023-05-19 NOTE — RESPIRATORY THERAPY NOTE
"RT Protocol Note  Baldev Olguin 34 y o  female MRN: [id number]  Unit/Bed#: ED 20 Encounter: [id number]    Assessment    Active Problems:    * No active hospital problems  *      Home Pulmonary Medications:  None noted       Past Medical History:   Diagnosis Date   • Rett syndrome      Social History     Socioeconomic History   • Marital status: Single     Spouse name: None   • Number of children: None   • Years of education: None   • Highest education level: None   Occupational History   • None   Tobacco Use   • Smoking status: Never   • Smokeless tobacco: Never   Substance and Sexual Activity   • Alcohol use: Not Currently   • Drug use: Not Currently   • Sexual activity: None   Other Topics Concern   • None   Social History Narrative   • None     Social Determinants of Health     Financial Resource Strain: Not on file   Food Insecurity: Not on file   Transportation Needs: Not on file   Physical Activity: Not on file   Stress: Not on file   Social Connections: Not on file   Intimate Partner Violence: Not on file   Housing Stability: Not on file       Subjective         Objective    Physical Exam:   General Appearance: Awake  Respiratory Pattern: Irregular, Tachypneic  Chest Assessment: Chest expansion symmetrical  Bilateral Breath Sounds: Coarse  Cough: Non-productive, Other (Comment) (seems to be conciously uncontrollable)  O2 Device: nasal cannula    Vitals:  Blood pressure 120/79, pulse (!) 140, temperature (!) 101 7 °F (38 7 °C), temperature source Oral, resp  rate (!) 66, height 4' 8\" (1 422 m), weight 38 6 kg (85 lb), last menstrual period 05/12/2023, SpO2 97 %            Imaging and other studies:     O2 Device: nasal cannula     Plan    Respiratory Plan: Mild Distress pathway        Resp Comments: uncontrollable coughing mixed with periods of tachypnea, spo2 post aerosol therapy 97% on 4L o2 nc, no noted pulmonary hx or home medication use, consideration of pneumonia, continue with PRN aerosol therapy " and wean oxygen as tolerated/indicated

## 2023-05-19 NOTE — PLAN OF CARE
Problem: MOBILITY - ADULT  Goal: Maintain or return to baseline ADL function  Description: INTERVENTIONS:  -  Assess patient's ability to carry out ADLs; assess patient's baseline for ADL function and identify physical deficits which impact ability to perform ADLs (bathing, care of mouth/teeth, toileting, grooming, dressing, etc )  - Assess/evaluate cause of self-care deficits   - Assess range of motion  - Assess patient's mobility; develop plan if impaired  - Assess patient's need for assistive devices and provide as appropriate  - Encourage maximum independence but intervene and supervise when necessary  - Involve family in performance of ADLs  - Assess for home care needs following discharge   - Consider OT consult to assist with ADL evaluation and planning for discharge  - Provide patient education as appropriate  Outcome: Progressing  Goal: Maintains/Returns to pre admission functional level  Description: INTERVENTIONS:  - Perform BMAT or MOVE assessment daily    - Set and communicate daily mobility goal to care team and patient/family/caregiver  - Collaborate with rehabilitation services on mobility goals if consulted  - Perform Range of Motion  times a day  - Reposition patient every  hours    - Dangle patient  times a day  - Stand patient  times a day  - Ambulate patient  times a day  - Out of bed to chair  times a day   - Out of bed for meals  times a day  - Out of bed for toileting  - Record patient progress and toleration of activity level   Outcome: Progressing     Problem: PAIN - ADULT  Goal: Verbalizes/displays adequate comfort level or baseline comfort level  Description: Interventions:  - Encourage patient to monitor pain and request assistance  - Assess pain using appropriate pain scale  - Administer analgesics based on type and severity of pain and evaluate response  - Implement non-pharmacological measures as appropriate and evaluate response  - Consider cultural and social influences on pain and pain management  - Notify physician/advanced practitioner if interventions unsuccessful or patient reports new pain  Outcome: Progressing     Problem: INFECTION - ADULT  Goal: Absence or prevention of progression during hospitalization  Description: INTERVENTIONS:  - Assess and monitor for signs and symptoms of infection  - Monitor lab/diagnostic results  - Monitor all insertion sites, i e  indwelling lines, tubes, and drains  - Monitor endotracheal if appropriate and nasal secretions for changes in amount and color  - [place] appropriate cooling/warming therapies per order  - Administer medications as ordered  - Instruct and encourage patient and family to use good hand hygiene technique  - Identify and instruct in appropriate isolation precautions for identified infection/condition  Outcome: Progressing  Goal: Absence of fever/infection during neutropenic period  Description: INTERVENTIONS:  - Monitor WBC    Outcome: Progressing     Problem: SAFETY ADULT  Goal: Maintain or return to baseline ADL function  Description: INTERVENTIONS:  -  Assess patient's ability to carry out ADLs; assess patient's baseline for ADL function and identify physical deficits which impact ability to perform ADLs (bathing, care of mouth/teeth, toileting, grooming, dressing, etc )  - Assess/evaluate cause of self-care deficits   - Assess range of motion  - Assess patient's mobility; develop plan if impaired  - Assess patient's need for assistive devices and provide as appropriate  - Encourage maximum independence but intervene and supervise when necessary  - Involve family in performance of ADLs  - Assess for home care needs following discharge   - Consider OT consult to assist with ADL evaluation and planning for discharge  - Provide patient education as appropriate  Outcome: Progressing  Goal: Maintains/Returns to pre admission functional level  Description: INTERVENTIONS:  - Perform BMAT or MOVE assessment daily    - Set and communicate daily mobility goal to care team and patient/family/caregiver  - Collaborate with rehabilitation services on mobility goals if consulted  - Perform Range of Motion  times a day  - Reposition patient every  hours    - Dangle patient  times a day  - Stand patient  times a day  - Ambulate patient  times a day  - Out of bed to chair  times a day   - Out of bed for meals times a day  - Out of bed for toileting  - Record patient progress and toleration of activity level   Outcome: Progressing  Goal: Patient will remain free of falls  Description: INTERVENTIONS:  -  Assess patient's ability to carry out ADLs; assess patient's baseline for ADL function and identify physical deficits which impact ability to perform ADLs (bathing, care of mouth/teeth, toileting, grooming, dressing, etc )  - Assess/evaluate cause of self-care deficits   - Assess range of motion  - Assess patient's mobility; develop plan if impaired  - Assess patient's need for assistive devices and provide as appropriate  - Encourage maximum independence but intervene and supervise when necessary  - Involve family in performance of ADLs  - Assess for home care needs following discharge   - Consider OT consult to assist with ADL evaluation and planning for discharge  - Provide patient education as appropriate  Outcome: Progressing     Problem: DISCHARGE PLANNING  Goal: Discharge to home or other facility with appropriate resources  Description: INTERVENTIONS:  - Identify barriers to discharge w/patient and caregiver  - Arrange for needed discharge resources and transportation as appropriate  - Identify discharge learning needs (meds, wound care, etc )  - Arrange for interpretive services to assist at discharge as needed  - Refer to Case Management Department for coordinating discharge planning if the patient needs post-hospital services based on physician/advanced practitioner order or complex needs related to functional status, cognitive ability, or social support system  Outcome: Progressing     Problem: Knowledge Deficit  Goal: Patient/family/caregiver demonstrates understanding of disease process, treatment plan, medications, and discharge instructions  Description: Complete learning assessment and assess knowledge base    Interventions:  - Provide teaching at level of understanding  - Provide teaching via preferred learning methods  Outcome: Progressing

## 2023-05-20 LAB
ERYTHROCYTE [DISTWIDTH] IN BLOOD BY AUTOMATED COUNT: 13.6 % (ref 11.6–15.1)
HCT VFR BLD AUTO: 34.1 % (ref 34.8–46.1)
HGB BLD-MCNC: 11.5 G/DL (ref 11.5–15.4)
LACTATE SERPL-SCNC: 1.8 MMOL/L (ref 0.5–2)
MCH RBC QN AUTO: 30.2 PG (ref 26.8–34.3)
MCHC RBC AUTO-ENTMCNC: 32.8 G/DL (ref 31.4–37.4)
MCV RBC AUTO: 92 FL (ref 82–98)
PLATELET # BLD AUTO: 255 THOUSANDS/UL (ref 149–390)
PMV BLD AUTO: 9.9 FL (ref 8.9–12.7)
RBC # BLD AUTO: 3.71 MILLION/UL (ref 3.81–5.12)
WBC # BLD AUTO: 13.37 THOUSAND/UL (ref 4.31–10.16)

## 2023-05-20 RX ORDER — GUAIFENESIN 600 MG/1
1200 TABLET, EXTENDED RELEASE ORAL 2 TIMES DAILY
Status: DISCONTINUED | OUTPATIENT
Start: 2023-05-20 | End: 2023-05-21 | Stop reason: HOSPADM

## 2023-05-20 RX ADMIN — SODIUM CHLORIDE 100 ML/HR: 0.9 INJECTION, SOLUTION INTRAVENOUS at 05:20

## 2023-05-20 RX ADMIN — LEVALBUTEROL HYDROCHLORIDE 1.25 MG: 1.25 SOLUTION RESPIRATORY (INHALATION) at 07:14

## 2023-05-20 RX ADMIN — GUAIFENESIN 1200 MG: 600 TABLET ORAL at 08:29

## 2023-05-20 RX ADMIN — LEVALBUTEROL HYDROCHLORIDE 1.25 MG: 1.25 SOLUTION RESPIRATORY (INHALATION) at 13:06

## 2023-05-20 RX ADMIN — LEVALBUTEROL HYDROCHLORIDE 1.25 MG: 1.25 SOLUTION RESPIRATORY (INHALATION) at 18:19

## 2023-05-20 RX ADMIN — ACETAMINOPHEN 650 MG: 325 TABLET, FILM COATED ORAL at 14:05

## 2023-05-20 RX ADMIN — GUAIFENESIN 1200 MG: 600 TABLET ORAL at 17:53

## 2023-05-20 RX ADMIN — IPRATROPIUM BROMIDE 0.5 MG: 0.5 SOLUTION RESPIRATORY (INHALATION) at 13:06

## 2023-05-20 RX ADMIN — IPRATROPIUM BROMIDE 0.5 MG: 0.5 SOLUTION RESPIRATORY (INHALATION) at 18:20

## 2023-05-20 RX ADMIN — CEFTRIAXONE SODIUM 1000 MG: 10 INJECTION, POWDER, FOR SOLUTION INTRAVENOUS at 02:10

## 2023-05-20 RX ADMIN — IPRATROPIUM BROMIDE 0.5 MG: 0.5 SOLUTION RESPIRATORY (INHALATION) at 07:14

## 2023-05-20 NOTE — RESPIRATORY THERAPY NOTE
"RT Protocol Note  Ivan Plant 34 y o  female MRN: [id number]  Unit/Bed#: -01 Encounter: [id number]    Assessment    Principal Problem:    Pneumonia  Active Problems:    Rett syndrome    Sepsis ([address] )      Home Pulmonary Medications:         Past Medical History:   Diagnosis Date   • Rett syndrome      Social History     Socioeconomic History   • Marital status: Single     Spouse name: None   • Number of children: None   • Years of education: None   • Highest education level: None   Occupational History   • None   Tobacco Use   • Smoking status: Never   • Smokeless tobacco: Never   Vaping Use   • Vaping Use: Never used   Substance and Sexual Activity   • Alcohol use: Never   • Drug use: Never   • Sexual activity: Not Currently   Other Topics Concern   • None   Social History Narrative   • None     Social Determinants of Health     Financial Resource Strain: Not on file   Food Insecurity: Not on file   Transportation Needs: Not on file   Physical Activity: Not on file   Stress: Not on file   Social Connections: Not on file   Intimate Partner Violence: Not on file   Housing Stability: Not on file       Subjective         Objective    Physical Exam:        Vitals:  Blood pressure 117/76, pulse (!) 115, temperature 100 2 °F (37 9 °C), resp  rate 18, height 4' 8\" (1 422 m), weight 38 6 kg (85 lb), last menstrual period 05/12/2023, SpO2 93 %  Imaging and other studies: I have personally reviewed pertinent reports        O2 Device: ra     Plan    Respiratory Plan: Mild Distress pathway        Resp Comments: will continue with current tx plan     "

## 2023-05-20 NOTE — PLAN OF CARE
Problem: INFECTION - ADULT  Goal: Absence or prevention of progression during hospitalization  Description: INTERVENTIONS:  - Assess and monitor for signs and symptoms of infection  - Monitor lab/diagnostic results  - Monitor all insertion sites, i e  indwelling lines, tubes, and drains  - Monitor endotracheal if appropriate and nasal secretions for changes in amount and color  - [place] appropriate cooling/warming therapies per order  - Administer medications as ordered  - Instruct and encourage patient and family to use good hand hygiene technique  - Identify and instruct in appropriate isolation precautions for identified infection/condition  Outcome: Progressing  Goal: Absence of fever/infection during neutropenic period  Description: INTERVENTIONS:  - Monitor WBC    Outcome: Progressing

## 2023-05-20 NOTE — ASSESSMENT & PLAN NOTE
· 2 days of worsening tachypnea, increased work of breathing, fevers per mom  · On admission, patient placed on 4 L nasal cannula due to increased work of breathing, due to persistent coughing fit now placed on 5 L nasal cannula saturating mid 90s, continue to monitor    Now satting well on room air  · Chest x-ray revealing left upper lobe and left middle lobe opacity  · COVID test negative  · Incentive spirometry, respiratory protocol  · Follow-up sputum culture and Gram stain, strep pneumoniae, Legionella  · Continue IV ceftriaxone 1 g daily  · As needed nebulizer treatments

## 2023-05-20 NOTE — UTILIZATION REVIEW
NOTIFICATION OF INPATIENT ADMISSION   AUTHORIZATION REQUEST   SERVICING FACILITY:   [address] [place] [address]  Tax ID: [id number]  NPI: [id number] ATTENDING PROVIDER:  Attending Name and NPI#: Nikko Huang Md [[id number]]  Address: [address] [place] [address]  Phone: [phone]     ADMISSION INFORMATION:  [facility] of Service: [facility]patient [facility]  [facility]  Place of Service Code: 21  Inpatient Admission Date/Time: 5/19/23  3:42 AM  Discharge Date/Time: No discharge date for patient encounter  Admitting Diagnosis Code/Description:  Cough [R05 9]  Hypoxia [R09 02]  Fever [R50 9]  Pneumonia involving left lung [J18 9]  Sepsis ([facility] [address] ) [A41 9]     UTILIZATION REVIEW CONTACT:  Bridgette Prasad Utilization   Network Utilization Review Department  Phone: [phone]  Fax [phone]  Email: [email or web address]  Contact for approvals/pending authorizations, clinical reviews, and discharge  PHYSICIAN ADVISORY SERVICES:  Medical Necessity Denial & Ndzn-yj-Kmhz Review  Phone: [phone]  Fax: [phone]  Email: [email or web address]

## 2023-05-20 NOTE — ASSESSMENT & PLAN NOTE
· Meeting criteria due to being febrile, tachycardic, tachypneic in the setting of pneumonia  · Lactic acid initially elevated at 4 2, continue to trend until less than 2    Lactic no normal post IV fluid  · Procalcitonin significantly elevated  · BC x2 negative at 24 hours  · Given IV fluid bolus in the ED, will give another IV fluid bolus now and continue aggressive IV fluid hydration  · Continue IV antibiotic

## 2023-05-21 VITALS
RESPIRATION RATE: 18 BRPM | HEIGHT: 56 IN | TEMPERATURE: 98.2 F | HEART RATE: 91 BPM | SYSTOLIC BLOOD PRESSURE: 125 MMHG | DIASTOLIC BLOOD PRESSURE: 79 MMHG | BODY MASS INDEX: 19.12 KG/M2 | OXYGEN SATURATION: 93 % | WEIGHT: 85 LBS

## 2023-05-21 LAB
ANION GAP SERPL CALCULATED.3IONS-SCNC: 8 MMOL/L (ref 4–13)
BASOPHILS # BLD AUTO: 0.03 THOUSANDS/ÂΜL (ref 0–0.1)
BASOPHILS NFR BLD AUTO: 0 % (ref 0–1)
BUN SERPL-MCNC: 8 MG/DL (ref 5–25)
CALCIUM SERPL-MCNC: 8.3 MG/DL (ref 8.4–10.2)
CHLORIDE SERPL-SCNC: 114 MMOL/L (ref 96–108)
CO2 SERPL-SCNC: 20 MMOL/L (ref 21–32)
CREAT SERPL-MCNC: 0.38 MG/DL (ref 0.6–1.3)
EOSINOPHIL # BLD AUTO: 0.05 THOUSAND/ÂΜL (ref 0–0.61)
EOSINOPHIL NFR BLD AUTO: 1 % (ref 0–6)
ERYTHROCYTE [DISTWIDTH] IN BLOOD BY AUTOMATED COUNT: 13.7 % (ref 11.6–15.1)
GFR SERPL CREATININE-BSD FRML MDRD: 146 ML/MIN/1.73SQ M
GLUCOSE SERPL-MCNC: 96 MG/DL (ref 65–140)
HCT VFR BLD AUTO: 34.7 % (ref 34.8–46.1)
HGB BLD-MCNC: 11.4 G/DL (ref 11.5–15.4)
IMM GRANULOCYTES # BLD AUTO: 0.21 THOUSAND/UL (ref 0–0.2)
IMM GRANULOCYTES NFR BLD AUTO: 2 % (ref 0–2)
LYMPHOCYTES # BLD AUTO: 1.96 THOUSANDS/ÂΜL (ref 0.6–4.47)
LYMPHOCYTES NFR BLD AUTO: 22 % (ref 14–44)
MCH RBC QN AUTO: 30.2 PG (ref 26.8–34.3)
MCHC RBC AUTO-ENTMCNC: 32.9 G/DL (ref 31.4–37.4)
MCV RBC AUTO: 92 FL (ref 82–98)
MONOCYTES # BLD AUTO: 0.78 THOUSAND/ÂΜL (ref 0.17–1.22)
MONOCYTES NFR BLD AUTO: 9 % (ref 4–12)
NEUTROPHILS # BLD AUTO: 5.96 THOUSANDS/ÂΜL (ref 1.85–7.62)
NEUTS SEG NFR BLD AUTO: 66 % (ref 43–75)
NRBC BLD AUTO-RTO: 0 /100 WBCS
PLATELET # BLD AUTO: 267 THOUSANDS/UL (ref 149–390)
PMV BLD AUTO: 9.8 FL (ref 8.9–12.7)
POTASSIUM SERPL-SCNC: 3.6 MMOL/L (ref 3.5–5.3)
PROCALCITONIN SERPL-MCNC: 5.41 NG/ML
RBC # BLD AUTO: 3.78 MILLION/UL (ref 3.81–5.12)
SODIUM SERPL-SCNC: 142 MMOL/L (ref 135–147)
WBC # BLD AUTO: 8.99 THOUSAND/UL (ref 4.31–10.16)

## 2023-05-21 RX ORDER — GLYCERIN 0.25 %
1 DROPS OPHTHALMIC (EYE) EVERY 2 HOUR PRN
Qty: 30 ML | Refills: 0 | Status: SHIPPED | OUTPATIENT
Start: 2023-05-21

## 2023-05-21 RX ORDER — IPRATROPIUM BROMIDE AND ALBUTEROL SULFATE 2.5; .5 MG/3ML; MG/3ML
3 SOLUTION RESPIRATORY (INHALATION) EVERY 4 HOURS PRN
Qty: 84 ML | Refills: 0 | Status: SHIPPED | OUTPATIENT
Start: 2023-05-21 | End: 2023-06-04

## 2023-05-21 RX ORDER — BENZONATATE 100 MG/1
100 CAPSULE ORAL 3 TIMES DAILY PRN
Qty: 20 CAPSULE | Refills: 0 | Status: SHIPPED | OUTPATIENT
Start: 2023-05-21

## 2023-05-21 RX ORDER — CEFDINIR 300 MG/1
300 CAPSULE ORAL EVERY 12 HOURS SCHEDULED
Qty: 8 CAPSULE | Refills: 0 | Status: SHIPPED | OUTPATIENT
Start: 2023-05-21 | End: 2023-05-25

## 2023-05-21 RX ORDER — GUAIFENESIN 1200 MG/1
1200 TABLET, EXTENDED RELEASE ORAL 2 TIMES DAILY
Qty: 28 TABLET | Refills: 0 | Status: SHIPPED | OUTPATIENT
Start: 2023-05-21 | End: 2023-06-04

## 2023-05-21 RX ORDER — LEVALBUTEROL INHALATION SOLUTION 1.25 MG/3ML
1.25 SOLUTION RESPIRATORY (INHALATION)
Qty: 126 ML | Refills: 0 | Status: SHIPPED | OUTPATIENT
Start: 2023-05-21 | End: 2023-06-04

## 2023-05-21 RX ADMIN — ACETAMINOPHEN 650 MG: 325 TABLET, FILM COATED ORAL at 09:41

## 2023-05-21 RX ADMIN — BENZONATATE 100 MG: 100 CAPSULE ORAL at 03:11

## 2023-05-21 RX ADMIN — IPRATROPIUM BROMIDE 0.5 MG: 0.5 SOLUTION RESPIRATORY (INHALATION) at 07:13

## 2023-05-21 RX ADMIN — CEFTRIAXONE SODIUM 1000 MG: 10 INJECTION, POWDER, FOR SOLUTION INTRAVENOUS at 02:48

## 2023-05-21 RX ADMIN — IPRATROPIUM BROMIDE AND ALBUTEROL SULFATE 3 ML: 2.5; .5 SOLUTION RESPIRATORY (INHALATION) at 04:11

## 2023-05-21 RX ADMIN — BENZONATATE 100 MG: 100 CAPSULE ORAL at 08:05

## 2023-05-21 RX ADMIN — LEVALBUTEROL HYDROCHLORIDE 1.25 MG: 1.25 SOLUTION RESPIRATORY (INHALATION) at 07:13

## 2023-05-21 RX ADMIN — GUAIFENESIN 1200 MG: 600 TABLET ORAL at 08:05

## 2023-05-21 NOTE — PLAN OF CARE
Problem: MOBILITY - ADULT  Goal: Maintain or return to baseline ADL function  Description: INTERVENTIONS:  -  Assess patient's ability to carry out ADLs; assess patient's baseline for ADL function and identify physical deficits which impact ability to perform ADLs (bathing, care of mouth/teeth, toileting, grooming, dressing, etc )  - Assess/evaluate cause of self-care deficits   - Assess range of motion  - Assess patient's mobility; develop plan if impaired  - Assess patient's need for assistive devices and provide as appropriate  - Encourage maximum independence but intervene and supervise when necessary  - Involve family in performance of ADLs  - Assess for home care needs following discharge   - Consider OT consult to assist with ADL evaluation and planning for discharge  - Provide patient education as appropriate  Outcome: Progressing  Goal: Maintains/Returns to pre admission functional level  Description: INTERVENTIONS:  - Perform BMAT or MOVE assessment daily    - Set and communicate daily mobility goal to care team and patient/family/caregiver  - Collaborate with rehabilitation services on mobility goals if consulted  - Perform Range of Motion 4 times a day  - Reposition patient every 4 hours    - Dangle patient 4 times a day  - Stand patient 4 times a day  - Ambulate patient 4 times a day  - Out of bed to chair 3 times a day   - Out of bed for meals 3 times a day  - Out of bed for toileting  - Record patient progress and toleration of activity level   Outcome: Progressing     Problem: PAIN - ADULT  Goal: Verbalizes/displays adequate comfort level or baseline comfort level  Description: Interventions:  - Encourage patient to monitor pain and request assistance  - Assess pain using appropriate pain scale  - Administer analgesics based on type and severity of pain and evaluate response  - Implement non-pharmacological measures as appropriate and evaluate response  - Consider cultural and social influences on pain and pain management  - Notify physician/advanced practitioner if interventions unsuccessful or patient reports new pain  Outcome: Progressing     Problem: INFECTION - ADULT  Goal: Absence or prevention of progression during hospitalization  Description: INTERVENTIONS:  - Assess and monitor for signs and symptoms of infection  - Monitor lab/diagnostic results  - Monitor all insertion sites, i e  indwelling lines, tubes, and drains  - Monitor endotracheal if appropriate and nasal secretions for changes in amount and color  - Marion appropriate cooling/warming therapies per order  - Administer medications as ordered  - Instruct and encourage patient and family to use good hand hygiene technique  - Identify and instruct in appropriate isolation precautions for identified infection/condition  Outcome: Progressing  Goal: Absence of fever/infection during neutropenic period  Description: INTERVENTIONS:  - Monitor WBC    Outcome: Progressing     Problem: SAFETY ADULT  Goal: Maintain or return to baseline ADL function  Description: INTERVENTIONS:  -  Assess patient's ability to carry out ADLs; assess patient's baseline for ADL function and identify physical deficits which impact ability to perform ADLs (bathing, care of mouth/teeth, toileting, grooming, dressing, etc )  - Assess/evaluate cause of self-care deficits   - Assess range of motion  - Assess patient's mobility; develop plan if impaired  - Assess patient's need for assistive devices and provide as appropriate  - Encourage maximum independence but intervene and supervise when necessary  - Involve family in performance of ADLs  - Assess for home care needs following discharge   - Consider OT consult to assist with ADL evaluation and planning for discharge  - Provide patient education as appropriate  Outcome: Progressing  Goal: Maintains/Returns to pre admission functional level  Description: INTERVENTIONS:  - Perform BMAT or MOVE assessment daily    - Set and communicate daily mobility goal to care team and patient/family/caregiver  - Collaborate with rehabilitation services on mobility goals if consulted  - Perform Range of Motion 4 times a day  - Reposition patient every 4 hours    - Dangle patient 4 times a day  - Stand patient 4 times a day  - Ambulate patient 4 times a day  - Out of bed to chair 3 times a day   - Out of bed for meals 3 times a day  - Out of bed for toileting  - Record patient progress and toleration of activity level   Outcome: Progressing  Goal: Patient will remain free of falls  Description: INTERVENTIONS:  - Educate patient/family on patient safety including physical limitations  - Instruct patient to call for assistance with activity   - Consult OT/PT to assist with strengthening/mobility   - Keep Call bell within reach  - Keep bed low and locked with side rails adjusted as appropriate  - Keep care items and personal belongings within reach  - Initiate and maintain comfort rounds  - Make Fall Risk Sign visible to staff  - Offer Toileting every 2 Hours, in advance of need  - Initiate/Maintain bed alarm  - Obtain necessary fall risk management equipment: bed alarm, nonskid socks  - Apply yellow socks and bracelet for high fall risk patients  - Consider moving patient to room near nurses station  Outcome: Progressing     Problem: DISCHARGE PLANNING  Goal: Discharge to home or other facility with appropriate resources  Description: INTERVENTIONS:  - Identify barriers to discharge w/patient and caregiver  - Arrange for needed discharge resources and transportation as appropriate  - Identify discharge learning needs (meds, wound care, etc )  - Arrange for interpretive services to assist at discharge as needed  - Refer to Case Management Department for coordinating discharge planning if the patient needs post-hospital services based on physician/advanced practitioner order or complex needs related to functional status, cognitive ability, or social support system  Outcome: Progressing     Problem: Knowledge Deficit  Goal: Patient/family/caregiver demonstrates understanding of disease process, treatment plan, medications, and discharge instructions  Description: Complete learning assessment and assess knowledge base    Interventions:  - Provide teaching at level of understanding  - Provide teaching via preferred learning methods  Outcome: Progressing     Problem: Prexisting or High Potential for Compromised Skin Integrity  Goal: Skin integrity is maintained or improved  Description: INTERVENTIONS:  - Identify patients at risk for skin breakdown  - Assess and monitor skin integrity  - Assess and monitor nutrition and hydration status  - Monitor labs   - Assess for incontinence   - Turn and reposition patient  - Assist with mobility/ambulation  - Relieve pressure over bony prominences  - Avoid friction and shearing  - Provide appropriate hygiene as needed including keeping skin clean and dry  - Evaluate need for skin moisturizer/barrier cream  - Collaborate with interdisciplinary team   - Patient/family teaching  - Consider wound care consult   Outcome: Progressing

## 2023-05-21 NOTE — ASSESSMENT & PLAN NOTE
· Meeting criteria due to being febrile, tachycardic, tachypneic in the setting of pneumonia  · Lactic acid initially elevated at 4 2, continue to trend until less than 2    Lactic no normal post IV fluid  · Procalcitonin significantly elevated  · BC x2 negative at 48 hours

## 2023-05-21 NOTE — DISCHARGE SUMMARY
[facility]  Discharge- Funmi Rodgers 1995, 34 y o  female MRN: [id number]  Unit/Bed#: -01 Encounter: [id number]  Primary Care Provider: No primary care provider on file  Date and time admitted to hospital: 5/19/2023  1:15 AM    * Pneumonia  Assessment & Plan  · Patient presented to the ED with complaints of increased work of breathing, tachypnea and fevers at home per mom  · Patient required 4 L of supplemental oxygen initially on admission but successfully weaned to room air  · Chest x-ray (5/19/23): Advanced consolidation throughout the left mid and lower lung fields  Prominent gas throughout multiple segments of bowel  · CT Chest (5/19/23): Streak artifact from patient's scoliosis hardware limits evaluation  Left upper and lower lobe infiltrates with a developing infiltrate in the right lower lobe     · Treated with 3 days of IV Ceftriaxone; now transitioned to PO Omnicef x 4 more days  · Discharge with nebulizers    Sepsis [facility])  Assessment & Plan  · Meeting criteria due to being febrile, tachycardic, tachypneic in the setting of pneumonia  · Lactic acid initially elevated at 4 2, continue to trend until less than 2  Lactic no normal post IV fluid  · Procalcitonin significantly elevated  · BC x2 negative at 48 hours    Rett syndrome  Assessment & Plan  · Nonverbal with intellectual disability at baseline  · Supportive care    Medical Problems     Resolved Problems  Date Reviewed: 5/21/2023   None       Discharging Physician / Practitioner: Barbara Cabral  PCP: No primary care provider on file  Admission Date:   Admission Orders (From admission, onward)     Ordered        05/19/23 0342  INPATIENT ADMISSION  Once                      Discharge Date: 05/21/23    Consultations During Hospital Stay:  None    Procedures Performed:   · None    Significant Findings / Test Results:   · Chest x-ray (5/19/23):  Advanced consolidation throughout the left mid and lower "lung fields  Prominent gas throughout multiple segments of bowel  · Chest CT (5/19/23): Streak artifact from patient's scoliosis hardware limits evaluation  Left upper and lower lobe infiltrates with a developing infiltrate in the right lower lobe  Recommend follow-up to resolution  Incidental Findings:   · None    Test Results Pending at Discharge (will require follow up): · None     Outpatient Tests Requested:  · Follow up with PCP within 1 wk    Complications:  None    Reason for Admission: Pneumonia; sepsis    Hospital Course:   Arya Smith is a 34 y o  female patient with past medical history of Rett syndrome, intellectual disability who originally presented to the hospital on 5/19/2023 due to nonproductive wet cough at home  On arrival to the ER patient was noted to have tachypnea, tachycardia and was febrile  Chest x-ray was completed and noted patient to have an advanced consolidation throughout the left mid and lower lung fields  Patient had chest CT which confirmed with an elevated procalcitonin of 17 initially  Patient was initiated on IV ceftriaxone and was maintained on supplemental oxygen  She was also started on Mucinex and nebulizers and improved  Patient was able to be weaned to room air and has had improvement in symptoms  Patient is stable for discharge      Please see above list of diagnoses and related plan for additional information  Condition at Discharge: stable    Discharge Day Visit / Exam:   Subjective: Patient resting in bed at this time    Denies any complaints of chest pain, shortness of breath, fever or chills  Vitals: Blood Pressure: 125/79 (05/20/23 2231)  Pulse: 91 (05/20/23 2231)  Temperature: 98 2 °F (36 8 °C) (05/20/23 2231)  Temp Source: Oral (05/19/23 2313)  Respirations: 18 (05/20/23 2231)  Height: 4' 8\" (142 2 cm) (05/19/23 0108)  Weight - Scale: 38 6 kg (85 lb) (05/19/23 0108)  SpO2: 93 % (05/21/23 0713)     Exam:   Physical Exam  Vitals and nursing " note reviewed  Constitutional:       General: She is not in acute distress  Appearance: She is normal weight  She is not ill-appearing  Cardiovascular:      Rate and Rhythm: Normal rate  Pulses: Normal pulses  Pulmonary:      Effort: Pulmonary effort is normal       Breath sounds: Normal breath sounds  Abdominal:      General: Bowel sounds are normal       Palpations: Abdomen is soft  Musculoskeletal:         General: Normal range of motion  Skin:     General: Skin is warm and dry  Neurological:      Mental Status: She is alert and oriented to person, place, and time  Psychiatric:         Mood and Affect: Mood normal           Discussion with Family: Patient declined call to   Discharge instructions/Information to patient and family:   See after visit summary for information provided to patient and family  Provisions for Follow-Up Care:  See after visit summary for information related to follow-up care and any pertinent home health orders  Disposition:   Home    Planned Readmission: None     Discharge Statement:  I spent 35 minutes discharging the patient  This time was spent on the day of discharge  I had direct contact with the patient on the day of discharge  Greater than 50% of the total time was spent examining patient, answering all patient questions, arranging and discussing plan of care with patient as well as directly providing post-discharge instructions  Additional time then spent on discharge activities  Discharge Medications:  See after visit summary for reconciled discharge medications provided to patient and/or family        **Please Note: This note may have been constructed using a voice recognition system**

## 2023-05-21 NOTE — ASSESSMENT & PLAN NOTE
· Patient presented to the ED with complaints of increased work of breathing, tachypnea and fevers at home per mom  · Patient required 4 L of supplemental oxygen initially on admission but successfully weaned to room air  · Chest x-ray (5/19/23): Advanced consolidation throughout the left mid and lower lung fields  Prominent gas throughout multiple segments of bowel  · CT Chest (5/19/23): Streak artifact from patient's scoliosis hardware limits evaluation  Left upper and lower lobe infiltrates with a developing infiltrate in the right lower lobe     · Treated with 3 days of IV Ceftriaxone; now transitioned to PO Omnicef x 4 more days    · Discharge with nebulizers

## 2023-05-22 NOTE — UTILIZATION REVIEW
NOTIFICATION OF ADMISSION DISCHARGE   This is a Notification of Discharge from [facility]  Please be advised that this patient has been discharge from our facility  Below you will find the admission and discharge date and time including the patient’s disposition  UTILIZATION REVIEW CONTACT:  Paresh Chavira  Utilization   Network Utilization Review Department  Phone: [phone] x carefully listen to the prompts  All voicemails are confidential   Email: [email or web address]     ADMISSION INFORMATION  PRESENTATION DATE: 5/19/2023  1:15 AM  OBERVATION ADMISSION DATE:   INPATIENT ADMISSION DATE: 5/19/23  3:42 AM   DISCHARGE DATE: 5/21/2023 10:37 AM   DISPOSITION:Home/Self Care    IMPORTANT INFORMATION:  Send all requests for admission clinical reviews, approved or denied determinations and any other requests to dedicated fax number below belonging to the campus where the patient is receiving treatment   List of dedicated fax numbers:  [address] DENIALS (Administrative/Medical Necessity) [phone]   [address] (Maternity/NICU/Pediatrics) [phone]   [facility] [phone]   [facility]  [phone]   [facility]  [phone]   [facility] [phone] [facility] [phone]   [address] [place] [address] [phone]   [facility]  [phone]   [facility] [phone]   [facility] [phone]

## 2023-05-24 LAB
BACTERIA BLD CULT: NORMAL
BACTERIA BLD CULT: NORMAL

## 2023-07-20 PROBLEM — J18.9 PNEUMONIA: Status: RESOLVED | Noted: 2023-05-19 | Resolved: 2023-07-20

## 2023-07-20 PROBLEM — A41.9 SEPSIS (HCC): Status: RESOLVED | Noted: 2023-05-19 | Resolved: 2023-07-20

## 2024-12-22 ENCOUNTER — APPOINTMENT (EMERGENCY)
Dept: CT IMAGING | Facility: HOSPITAL | Age: 29
End: 2024-12-22
Payer: COMMERCIAL

## 2024-12-22 ENCOUNTER — HOSPITAL ENCOUNTER (EMERGENCY)
Facility: HOSPITAL | Age: 29
Discharge: HOME | End: 2024-12-22
Attending: EMERGENCY MEDICINE
Payer: COMMERCIAL

## 2024-12-22 VITALS
DIASTOLIC BLOOD PRESSURE: 87 MMHG | TEMPERATURE: 98.4 F | SYSTOLIC BLOOD PRESSURE: 124 MMHG | HEART RATE: 101 BPM | RESPIRATION RATE: 18 BRPM | OXYGEN SATURATION: 98 %

## 2024-12-22 DIAGNOSIS — K52.9 COLITIS: Primary | ICD-10-CM

## 2024-12-22 LAB
ALBUMIN SERPL BCG-MCNC: 4.2 G/DL (ref 3.5–5)
ALP SERPL-CCNC: 83 U/L (ref 34–104)
ALT SERPL W P-5'-P-CCNC: 26 U/L (ref 7–52)
ANION GAP SERPL CALCULATED.3IONS-SCNC: 4 MMOL/L (ref 4–13)
AST SERPL W P-5'-P-CCNC: 18 U/L (ref 13–39)
BASOPHILS # BLD AUTO: 0.02 THOUSANDS/ÂΜL (ref 0–0.1)
BASOPHILS NFR BLD AUTO: 0 % (ref 0–1)
BILIRUB SERPL-MCNC: 0.38 MG/DL (ref 0.2–1)
BUN SERPL-MCNC: 11 MG/DL (ref 5–25)
CALCIUM SERPL-MCNC: 9 MG/DL (ref 8.4–10.2)
CHLORIDE SERPL-SCNC: 106 MMOL/L (ref 96–108)
CO2 SERPL-SCNC: 30 MMOL/L (ref 21–32)
CREAT SERPL-MCNC: 0.55 MG/DL (ref 0.6–1.3)
EOSINOPHIL # BLD AUTO: 0.08 THOUSAND/ÂΜL (ref 0–0.61)
EOSINOPHIL NFR BLD AUTO: 1 % (ref 0–6)
ERYTHROCYTE [DISTWIDTH] IN BLOOD BY AUTOMATED COUNT: 11.9 % (ref 11.6–15.1)
FLUAV AG UPPER RESP QL IA.RAPID: NEGATIVE
FLUBV AG UPPER RESP QL IA.RAPID: NEGATIVE
GFR SERPL CREATININE-BSD FRML MDRD: 128 ML/MIN/1.73SQ M
GLUCOSE SERPL-MCNC: 99 MG/DL (ref 65–140)
HCG SERPL QL: NEGATIVE
HCT VFR BLD AUTO: 47 % (ref 34.8–46.1)
HGB BLD-MCNC: 15.3 G/DL (ref 11.5–15.4)
IMM GRANULOCYTES # BLD AUTO: 0.04 THOUSAND/UL (ref 0–0.2)
IMM GRANULOCYTES NFR BLD AUTO: 0 % (ref 0–2)
LIPASE SERPL-CCNC: 19 U/L (ref 11–82)
LYMPHOCYTES # BLD AUTO: 2.02 THOUSANDS/ÂΜL (ref 0.6–4.47)
LYMPHOCYTES NFR BLD AUTO: 18 % (ref 14–44)
MAGNESIUM SERPL-MCNC: 2 MG/DL (ref 1.9–2.7)
MCH RBC QN AUTO: 30 PG (ref 26.8–34.3)
MCHC RBC AUTO-ENTMCNC: 32.6 G/DL (ref 31.4–37.4)
MCV RBC AUTO: 92 FL (ref 82–98)
MONOCYTES # BLD AUTO: 0.63 THOUSAND/ÂΜL (ref 0.17–1.22)
MONOCYTES NFR BLD AUTO: 6 % (ref 4–12)
NEUTROPHILS # BLD AUTO: 8.38 THOUSANDS/ÂΜL (ref 1.85–7.62)
NEUTS SEG NFR BLD AUTO: 75 % (ref 43–75)
NRBC BLD AUTO-RTO: 0 /100 WBCS
PLATELET # BLD AUTO: 212 THOUSANDS/UL (ref 149–390)
PMV BLD AUTO: 10 FL (ref 8.9–12.7)
POTASSIUM SERPL-SCNC: 3.4 MMOL/L (ref 3.5–5.3)
PROT SERPL-MCNC: 7.3 G/DL (ref 6.4–8.4)
RBC # BLD AUTO: 5.1 MILLION/UL (ref 3.81–5.12)
SARS-COV+SARS-COV-2 AG RESP QL IA.RAPID: NEGATIVE
SODIUM SERPL-SCNC: 140 MMOL/L (ref 135–147)
WBC # BLD AUTO: 11.17 THOUSAND/UL (ref 4.31–10.16)

## 2024-12-22 PROCEDURE — 36415 COLL VENOUS BLD VENIPUNCTURE: CPT | Performed by: EMERGENCY MEDICINE

## 2024-12-22 PROCEDURE — 74177 CT ABD & PELVIS W/CONTRAST: CPT

## 2024-12-22 PROCEDURE — 84703 CHORIONIC GONADOTROPIN ASSAY: CPT | Performed by: EMERGENCY MEDICINE

## 2024-12-22 PROCEDURE — 80053 COMPREHEN METABOLIC PANEL: CPT | Performed by: EMERGENCY MEDICINE

## 2024-12-22 PROCEDURE — 83735 ASSAY OF MAGNESIUM: CPT | Performed by: EMERGENCY MEDICINE

## 2024-12-22 PROCEDURE — 96365 THER/PROPH/DIAG IV INF INIT: CPT

## 2024-12-22 PROCEDURE — 85025 COMPLETE CBC W/AUTO DIFF WBC: CPT | Performed by: EMERGENCY MEDICINE

## 2024-12-22 PROCEDURE — 99285 EMERGENCY DEPT VISIT HI MDM: CPT | Performed by: EMERGENCY MEDICINE

## 2024-12-22 PROCEDURE — 87811 SARS-COV-2 COVID19 W/OPTIC: CPT | Performed by: EMERGENCY MEDICINE

## 2024-12-22 PROCEDURE — 99284 EMERGENCY DEPT VISIT MOD MDM: CPT

## 2024-12-22 PROCEDURE — 87804 INFLUENZA ASSAY W/OPTIC: CPT | Performed by: EMERGENCY MEDICINE

## 2024-12-22 PROCEDURE — 83690 ASSAY OF LIPASE: CPT | Performed by: EMERGENCY MEDICINE

## 2024-12-22 RX ORDER — DICYCLOMINE HCL 20 MG
20 TABLET ORAL 2 TIMES DAILY
Qty: 20 TABLET | Refills: 0 | Status: SHIPPED | OUTPATIENT
Start: 2024-12-22

## 2024-12-22 RX ADMIN — IOHEXOL 100 ML: 350 INJECTION, SOLUTION INTRAVENOUS at 20:07

## 2024-12-22 RX ADMIN — SODIUM CHLORIDE, SODIUM LACTATE, POTASSIUM CHLORIDE, AND CALCIUM CHLORIDE 1000 ML: .6; .31; .03; .02 INJECTION, SOLUTION INTRAVENOUS at 17:09

## 2024-12-22 NOTE — ED PROVIDER NOTES
Time reflects when diagnosis was documented in both MDM as applicable and the Disposition within this note       Time User Action Codes Description Comment    12/22/2024  8:44 PM Maggie Falcon Add [K52.9] Colitis           ED Disposition       ED Disposition   Discharge    Condition   Stable    Date/Time   Sun Dec 22, 2024  8:44 PM    Comment   Anaya Seenarine discharge to home/self care.                   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  Patient is well-appearing at bedside with stable vitals and in no acute distress.  She has no abdominal tenderness, normal bowel sounds no other significant physical exam findings.  Due to limitation in history as patient has history of a [place] disability as well as previous surgical history will obtain CT with IV and p.o. contrast to rule out bowel obstruction as well as other intra-abdominal pathology, labs to assess for electrolyte abnormalities, anemia, EVANS, give fluids and continue to monitor    Amount and/or Complexity of Data Reviewed  Labs: ordered.  Radiology: ordered.    Risk  Prescription drug management.        ED Course as of 12/22/24 2045   Sun Dec 22, 2024   2044 With colitis, have discussed return precautions and outpatient follow-up which family states they understand.       Medications   lactated ringers bolus 1,000 mL (1,000 mL Intravenous New Bag 12/22/24 1709)   iohexol (OMNIPAQUE) 350 MG/ML injection (MULTI-DOSE) 100 mL (100 mL Intravenous Given 12/22/24 2007)   iohexol (OMNIPAQUE) 240 MG/ML solution 50 mL (50 mL Oral Given 12/22/24 2008)       ED Risk Strat Scores                          SBIRT 20yo+      Flowsheet Row Most Recent Value   Initial Alcohol Screen: US AUDIT-C     1. How often do you have a drink containing alcohol? 0 Filed at: 12/22/2024 1610   2. How many drinks containing alcohol do you have on a  typical day you are drinking?  0 Filed at: 12/22/2024 1610   3b. FEMALE Any Age, or MALE 65+: How often do you have 4 or more drinks on one occassion? 0 Filed at: 12/22/2024 1610   Audit-C Score 0 Filed at: 12/22/2024 1610   SALVADOR: How many times in the past year have you...    Used an illegal drug or used a prescription medication for non-medical reasons? Never Filed at: 12/22/2024 1610                            History of Present Illness       Chief Complaint   Patient presents with    Diarrhea     Patient arrived to ER c/o diarrhea started a few days ago. Denies blood in stool +abd pain        Past Medical History:   Diagnosis Date    Rett syndrome       Past Surgical History:   Procedure Laterality Date    FL GUIDED NEEDLE PLAC BX/ASP/INJ  1/3/2013      History reviewed. No pertinent family history.   Social History     Tobacco Use    Smoking status: Never    Smokeless tobacco: Never   Vaping Use    Vaping status: Never Used   Substance Use Topics    Alcohol use: Never    Drug use: Never      E-Cigarette/Vaping    E-Cigarette Use Never User       E-Cigarette/Vaping Substances    Nicotine No     THC No     CBD No     Flavoring No     Other No     Unknown No       I have reviewed and agree with the history as documented.     Patient is a 29-year-old female past medical history of Rett syndrome and intellectual disability presenting for diarrhea.  Mother and grandmother at bedside states that patient began having diarrhea roughly 4-5 episodes a day several days ago.  States that she seems to be in pain when she is having a bowel movement but they deny any melena or bloody stools.  Deny any vomiting, cough, upper respiratory symptoms, fevers, shortness of breath.  Denies any recent hospitalizations or antibiotic use.  Does have a history of a colectomy which has been reversed.  They do note decreased p.o. intake.  Last normal bowel movement was 5 days ago.        Review of Systems   All other systems reviewed and  are negative.          Objective       ED Triage Vitals   Temperature Pulse Blood Pressure Respirations SpO2 Patient Position - Orthostatic VS   12/22/24 1609 12/22/24 1609 12/22/24 1713 12/22/24 1609 12/22/24 1609 12/22/24 1609   97.8 °F (36.6 °C) 69 134/86 18 100 % Sitting      Temp Source Heart Rate Source BP Location FiO2 (%) Pain Score    12/22/24 1609 12/22/24 1609 12/22/24 1609 -- --    Temporal Monitor Left arm        Vitals      Date and Time Temp Pulse SpO2 Resp BP Pain Score FACES Pain Rating User   12/22/24 1713 -- -- -- -- 134/86 -- -- JK   12/22/24 1609 97.8 °F (36.6 °C) 69 100 % 18 -- -- -- AA            Physical Exam  Vitals reviewed.   Constitutional:       General: She is not in acute distress.     Appearance: Normal appearance. She is not ill-appearing.   HENT:      Mouth/Throat:      Mouth: Mucous membranes are moist.   Eyes:      Conjunctiva/sclera: Conjunctivae normal.   Cardiovascular:      Rate and Rhythm: Normal rate and regular rhythm.      Pulses: Normal pulses.      Heart sounds: Normal heart sounds.   Pulmonary:      Effort: Pulmonary effort is normal.      Breath sounds: Normal breath sounds.   Abdominal:      General: Abdomen is flat. Bowel sounds are normal.      Palpations: Abdomen is soft.      Tenderness: There is no abdominal tenderness.   Musculoskeletal:         General: No swelling. Normal range of motion.      Cervical back: Neck supple.   Skin:     General: Skin is warm and dry.   Neurological:      General: No focal deficit present.      Mental Status: She is alert.   Psychiatric:         Mood and Affect: Mood normal.         Results Reviewed       Procedure Component Value Units Date/Time    hCG, qualitative pregnancy [[id number]]  (Normal) Collected: 12/22/24 1708    Lab Status: Final result Specimen: Blood from Arm, Right Updated: 12/22/24 1743     Preg, Serum Negative    Comprehensive metabolic panel [[id number]]  (Abnormal) Collected: 12/22/24 1708    Lab Status: Final  result Specimen: Blood from Arm, Right Updated: 12/22/24 1739     Sodium 140 mmol/L      Potassium 3.4 mmol/L      Chloride 106 mmol/L      CO2 30 mmol/L      ANION GAP 4 mmol/L      BUN 11 mg/dL      Creatinine 0.55 mg/dL      Glucose 99 mg/dL      Calcium 9.0 mg/dL      AST 18 U/L      ALT 26 U/L      Alkaline Phosphatase 83 U/L      Total Protein 7.3 g/dL      Albumin 4.2 g/dL      Total Bilirubin 0.38 mg/dL      eGFR 128 ml/min/1.73sq m     Narrative:      National Kidney Disease Foundation guidelines for Chronic Kidney Disease (CKD):     Stage 1 with normal or high GFR (GFR > 90 mL/min/1.73 square meters)    Stage 2 Mild CKD (GFR = 60-89 mL/min/1.73 square meters)    Stage 3A Moderate CKD (GFR = 45-59 mL/min/1.73 square meters)    Stage 3B Moderate CKD (GFR = 30-44 mL/min/1.73 square meters)    Stage 4 Severe CKD (GFR = 15-29 mL/min/1.73 square meters)    Stage 5 End Stage CKD (GFR <15 mL/min/1.73 square meters)  Note: GFR calculation is accurate only with a steady state creatinine    Lipase [[id number]]  (Normal) Collected: 12/22/24 1708    Lab Status: Final result Specimen: Blood from Arm, Right Updated: 12/22/24 1739     Lipase 19 u/L     Magnesium [[id number]]  (Normal) Collected: 12/22/24 1708    Lab Status: Final result Specimen: Blood from Arm, Right Updated: 12/22/24 1739     Magnesium 2.0 mg/dL     FLU/COVID Rapid Antigen (30 min. TAT) - Preferred screening test in ED [[id number]]  (Normal) Collected: 12/22/24 1708    Lab Status: Final result Specimen: Nares from Nose Updated: 12/22/24 1730     SARS COV Rapid Antigen Negative     Influenza A Rapid Antigen Negative     Influenza B Rapid Antigen Negative    Narrative:      This test has been performed using the "" Meghna 2 FLU+SARS Antigen test under the Emergency Use Authorization (EUA). This test has been validated by the  and verified by the performing laboratory. The Meghna uses lateral flow immunofluorescent sandwich assay to detect  SARS-COV, Influenza A and Influenza B Antigen.     The Quidel Meghna 2 SARS Antigen test does not differentiate between SARS-CoV and SARS-CoV-2.     Negative results are presumptive and may be confirmed with a molecular assay, if necessary, for patient management. Negative results do not rule out SARS-CoV-2 or influenza infection and should not be used as the sole basis for treatment or patient management decisions. A negative test result may occur if the level of antigen in a sample is below the limit of detection of this test.     Positive results are indicative of the presence of viral antigens, but do not rule out bacterial infection or co-infection with other viruses.     All test results should be used as an adjunct to clinical observations and other information available to the provider.    FOR PEDIATRIC PATIENTS - copy/paste COVID Guidelines URL to browser: [email or web address]    CBC and differential [[id number]]  (Abnormal) Collected: 12/22/24 1708    Lab Status: Final result Specimen: Blood from Arm, Right Updated: 12/22/24 1713     WBC 11.17 Thousand/uL      RBC 5.10 Million/uL      Hemoglobin 15.3 g/dL      Hematocrit 47.0 %      MCV 92 fL      MCH 30.0 pg      MCHC 32.6 g/dL      RDW 11.9 %      MPV 10.0 fL      Platelets 212 Thousands/uL      nRBC 0 /100 WBCs      Segmented % 75 %      Immature Grans % 0 %      Lymphocytes % 18 %      Monocytes % 6 %      Eosinophils Relative 1 %      Basophils Relative 0 %      Absolute Neutrophils 8.38 Thousands/µL      Absolute Immature Grans 0.04 Thousand/uL      Absolute Lymphocytes 2.02 Thousands/µL      Absolute Monocytes 0.63 Thousand/µL      Eosinophils Absolute 0.08 Thousand/µL      Basophils Absolute 0.02 Thousands/µL             CT abdomen pelvis with contrast   Final Interpretation by Foreign Hernandez MD (12/22 2038)      Moderate circumferential wall thickening of the sigmoid colon and rectum with  mild surrounding inflammatory stranding suspicious for an acute infectious or inflammatory proctocolitis.      A moderate amount of air is noted throughout the small bowel. No evidence of obstruction.      No free air or free fluid.         Workstation performed: [id number]             Procedures    ED Medication and Procedure Management   Prior to Admission Medications   Prescriptions Last Dose Informant Patient Reported? Taking?   Phenol-Glycerin (Chloraseptic Max Sore Throat) 1.5-33 % LIQD   No No   Sig: Apply 1 spray to the mouth or throat every 2 (two) hours as needed (Sore throat)   benzonatate (TESSALON PERLES) 100 mg capsule   No No   Sig: Take 1 capsule (100 mg total) by mouth 3 (three) times a day as needed for cough   ipratropium (ATROVENT) 0.02 % nebulizer solution   No No   Sig: Take 2.5 mL (0.5 mg total) by nebulization 3 (three) times a day for 14 days      Facility-Administered Medications: None     Patient's Medications   Discharge Prescriptions    DICYCLOMINE (BENTYL) 20 MG TABLET    Take 1 tablet (20 mg total) by mouth 2 (two) times a day       Start Date: 12/22/2024End Date: --       Order Dose: 20 mg       Quantity: 20 tablet    Refills: 0       ED SEPSIS DOCUMENTATION   Time reflects when diagnosis was documented in both MDM as applicable and the Disposition within this note       Time User Action Codes Description Comment    12/22/2024  8:44 PM Maggie Falcon [K52.9] Colitis                  Maggie Falcon,   12/22/24 2045

## 2024-12-23 NOTE — ED NOTES
Followed up with pt family. Patient drank 3/4 of 1 cup of contrast. CT alerted pt ready      Adali Cardoso RN  12/22/24 1932

## 2024-12-27 ENCOUNTER — OFFICE VISIT (OUTPATIENT)
Dept: GASTROENTEROLOGY | Facility: CLINIC | Age: 29
End: 2024-12-27
Payer: COMMERCIAL

## 2024-12-27 ENCOUNTER — APPOINTMENT (OUTPATIENT)
Dept: LAB | Facility: HOSPITAL | Age: 29
End: 2024-12-27

## 2024-12-27 VITALS
HEART RATE: 135 BPM | OXYGEN SATURATION: 100 % | WEIGHT: 93 LBS | HEIGHT: 59 IN | SYSTOLIC BLOOD PRESSURE: 90 MMHG | DIASTOLIC BLOOD PRESSURE: 64 MMHG | TEMPERATURE: 97.5 F | BODY MASS INDEX: 18.75 KG/M2

## 2024-12-27 DIAGNOSIS — K52.9 COLITIS: ICD-10-CM

## 2024-12-27 DIAGNOSIS — R19.7 DIARRHEA OF PRESUMED INFECTIOUS ORIGIN: Primary | ICD-10-CM

## 2024-12-27 PROCEDURE — 99203 OFFICE O/P NEW LOW 30 MIN: CPT | Performed by: PHYSICIAN ASSISTANT

## 2024-12-27 NOTE — PROGRESS NOTES
Name: Anaya Lyons      : 1995      MRN: [id number]  Encounter Provider: Staci Valadez PA-C  Encounter Date: 2024   Encounter department: [facility] [place]  :  Assessment & Plan  Colitis    Diarrhea of presumed infectious origin  Started with diarrhea 2 weeks ago  Symptoms have come and gone since that time but persistent over the past few days  ER visit on   CT noted rectosigmoiditis  WBC was slightly elevated  Potassium was slightly low    Suspect infectious process  F/U Stool cultures - order provided    Dicyclomine PRN - warned it can cause drowsiness    Ensure hydration  Encouraged pedialyte  Discussed MAZIN diet    Reviewed that there is no indication acutely for a colonoscopy however if symptoms worsen/persist we will discuss this      History of Present Illness   HPI  Anaya yLons is a 29 y.o. 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  Per the patient's mother and grandmother at the bedside the patient has been having diarrhea for the past 2 weeks.  The diarrhea symptoms have been off and on.  The patient's mother states that she will have severe diarrhea for 2 or 3 days and then have a day or 2 without anything.  She reports that on the days that she has a diarrhea she seems to have significant abdominal pain as she seems restless and uncomfortable.  There has been no nausea or vomiting.  She did have a low-grade fever on  Britt.  The patient was in the emergency room on  with these complaints.  Labs noted a slight elevation in the white blood cell count and a slightly low potassium at 3.4.  CT scan showed distal colitis.  She was given a prescription for dicyclomine and advised to follow-up with gastroenterology.  Per the patient's mom on the days where she does not have the diarrhea she eats  well.  On the days that she does have the diarrhea she sticks to liquids.  She has been trying to keep her hydrated.  She picked up Pedialyte and had her drink the entire bottle over the past 24 hours.  The patient has no history of severe diarrhea.  Ever since her surgery 13 years ago she is struggled more with constipation.  The patient is typically maintained on a probiotic.  Per the patient's mom and grandmother there has been no recent medication changes, antibiotics, travel or sick contacts.    History obtained from: patient's Legal Guardian    Review of Systems  Medical History Reviewed by provider this encounter:     .  Past Medical History   Past Medical History:   Diagnosis Date    Rett syndrome      Past Surgical History:   Procedure Laterality Date    FL GUIDED NEEDLE PLAC BX/ASP/INJ  1/3/2013     History reviewed. No pertinent family history.   reports that she has never smoked. She has never used smokeless tobacco. She reports that she does not drink alcohol and does not use drugs.  Current Outpatient Medications on File Prior to Visit   Medication Sig Dispense Refill    dicyclomine (BENTYL) 20 mg tablet Take 1 tablet (20 mg total) by mouth 2 (two) times a day 20 tablet 0    benzonatate (TESSALON PERLES) 100 mg capsule Take 1 capsule (100 mg total) by mouth 3 (three) times a day as needed for cough (Patient not taking: Reported on 12/27/2024) 20 capsule 0    ipratropium (ATROVENT) 0.02 % nebulizer solution Take 2.5 mL (0.5 mg total) by nebulization 3 (three) times a day for 14 days (Patient not taking: Reported on 12/27/2024) 105 mL 0    Phenol-Glycerin (Chloraseptic Max Sore Throat) 1.5-33 % LIQD Apply 1 spray to the mouth or throat every 2 (two) hours as needed (Sore throat) (Patient not taking: Reported on 12/27/2024) 30 mL 0     No current facility-administered medications on file prior to visit.     Allergies   Allergen Reactions    Ibuprofen Hyperactivity    Pollen Extract Sneezing    Vancomycin  "Other (See Comments)     Red khoi       Current Outpatient Medications on File Prior to Visit   Medication Sig Dispense Refill    dicyclomine (BENTYL) 20 mg tablet Take 1 tablet (20 mg total) by mouth 2 (two) times a day 20 tablet 0    benzonatate (TESSALON PERLES) 100 mg capsule Take 1 capsule (100 mg total) by mouth 3 (three) times a day as needed for cough (Patient not taking: Reported on 12/27/2024) 20 capsule 0    ipratropium (ATROVENT) 0.02 % nebulizer solution Take 2.5 mL (0.5 mg total) by nebulization 3 (three) times a day for 14 days (Patient not taking: Reported on 12/27/2024) 105 mL 0    Phenol-Glycerin (Chloraseptic Max Sore Throat) 1.5-33 % LIQD Apply 1 spray to the mouth or throat every 2 (two) hours as needed (Sore throat) (Patient not taking: Reported on 12/27/2024) 30 mL 0     No current facility-administered medications on file prior to visit.      Social History     Tobacco Use    Smoking status: Never    Smokeless tobacco: Never   Vaping Use    Vaping status: Never Used   Substance and Sexual Activity    Alcohol use: Never    Drug use: Never    Sexual activity: Not Currently        Objective   BP 90/64 (BP Location: Left arm, Patient Position: Sitting, Cuff Size: Standard)   Pulse (!) 135   Temp 97.5 °F (36.4 °C) (Temporal)   Ht 4' 11\" (1.499 m)   Wt 42.2 kg (93 lb)   LMP 12/08/2024 (Exact Date)   SpO2 100%   BMI 18.78 kg/m²      Physical Exam      "

## 2024-12-30 ENCOUNTER — TELEPHONE (OUTPATIENT)
Age: 29
End: 2024-12-30

## 2024-12-30 NOTE — TELEPHONE ENCOUNTER
Patients GI provider:  JULIETTE Carias    Number to return call: [phone]    Reason for call: Pt's mother Anai (has consent) calling stating they didn't realize they only had 48 hrs to deliver stool sample to lab. They held since 12/27 and all weekend and lab told them too late. They would like another order for stool test placed again. I called the office and spoke w/ Ernestina who will give clinical team pt's name to place order. Thank you.    Scheduled procedure/appointment date if applicable: N/A

## 2024-12-30 NOTE — TELEPHONE ENCOUNTER
Stool test order still on patient's chart as active.  Called pt and LMOM to call us back and let ud know which lab she went to do the stool test to then call the lab and find out if pt needs another order to be place.  Office # provided.

## 2024-12-30 NOTE — TELEPHONE ENCOUNTER
Received the call from Lynette and spoke with pt's mom .  Advised Adore to go back to the lab and ask for another container and ask if the patient can use the same stool test on file and to call me back if a order needs to be place.  Adore voiced understanding.
